# Patient Record
Sex: MALE | Race: WHITE | Employment: OTHER
[De-identification: names, ages, dates, MRNs, and addresses within clinical notes are randomized per-mention and may not be internally consistent; named-entity substitution may affect disease eponyms.]

---

## 2018-09-26 RX ORDER — POTASSIUM CHLORIDE 20 MEQ/1
TABLET, EXTENDED RELEASE ORAL
Qty: 90 TAB | Refills: 1 | Status: SHIPPED | OUTPATIENT
Start: 2018-09-26

## 2024-11-29 ENCOUNTER — APPOINTMENT (OUTPATIENT)
Facility: HOSPITAL | Age: 84
DRG: 521 | End: 2024-11-29
Payer: MEDICARE

## 2024-11-29 ENCOUNTER — HOSPITAL ENCOUNTER (INPATIENT)
Facility: HOSPITAL | Age: 84
Discharge: HOME | DRG: 521 | End: 2024-12-02
Payer: MEDICARE

## 2024-11-29 ENCOUNTER — HOSPITAL ENCOUNTER (INPATIENT)
Facility: HOSPITAL | Age: 84
LOS: 6 days | Discharge: NURSING, REHAB OR LONG-TERM CARE | DRG: 521 | End: 2024-12-05
Attending: STUDENT IN AN ORGANIZED HEALTH CARE EDUCATION/TRAINING PROGRAM
Payer: MEDICARE

## 2024-11-29 DIAGNOSIS — T14.8XXA FRACTURE: ICD-10-CM

## 2024-11-29 DIAGNOSIS — S72.002A CLOSED FRACTURE OF LEFT HIP, INITIAL ENCOUNTER (HCC): Primary | ICD-10-CM

## 2024-11-29 DIAGNOSIS — I63.9 CEREBROVASCULAR ACCIDENT (CVA), UNSPECIFIED MECHANISM (HCC): ICD-10-CM

## 2024-11-29 LAB
ABO + RH BLD: NORMAL
ALBUMIN SERPL-MCNC: 3.9 G/DL (ref 3.5–5)
ALBUMIN/GLOB SERPL: 1.1 (ref 1.1–2.2)
ALP SERPL-CCNC: 116 U/L (ref 45–117)
ALT SERPL-CCNC: 24 U/L (ref 12–78)
ANION GAP SERPL CALC-SCNC: 5 MMOL/L (ref 2–12)
APTT PPP: 26.5 SEC (ref 21.2–34.1)
AST SERPL W P-5'-P-CCNC: 14 U/L (ref 15–37)
BASOPHILS # BLD: 0 K/UL (ref 0–0.1)
BASOPHILS NFR BLD: 0 % (ref 0–1)
BILIRUB SERPL-MCNC: 1.6 MG/DL (ref 0.2–1)
BLOOD GROUP ANTIBODIES SERPL: NEGATIVE
BUN SERPL-MCNC: 22 MG/DL (ref 6–20)
BUN/CREAT SERPL: 17 (ref 12–20)
CA-I BLD-MCNC: 9.1 MG/DL (ref 8.5–10.1)
CHLORIDE SERPL-SCNC: 110 MMOL/L (ref 97–108)
CO2 SERPL-SCNC: 28 MMOL/L (ref 21–32)
CREAT SERPL-MCNC: 1.32 MG/DL (ref 0.7–1.3)
DIFFERENTIAL METHOD BLD: ABNORMAL
EOSINOPHIL # BLD: 0 K/UL (ref 0–0.4)
EOSINOPHIL NFR BLD: 0 % (ref 0–7)
ERYTHROCYTE [DISTWIDTH] IN BLOOD BY AUTOMATED COUNT: 13 % (ref 11.5–14.5)
GLOBULIN SER CALC-MCNC: 3.7 G/DL (ref 2–4)
GLUCOSE SERPL-MCNC: 146 MG/DL (ref 65–100)
HCT VFR BLD AUTO: 46.7 % (ref 36.6–50.3)
HGB BLD-MCNC: 15.8 G/DL (ref 12.1–17)
IMM GRANULOCYTES # BLD AUTO: 0.1 K/UL (ref 0–0.04)
IMM GRANULOCYTES NFR BLD AUTO: 2 % (ref 0–0.5)
INR PPP: 1 (ref 0.9–1.1)
LYMPHOCYTES # BLD: 1.4 K/UL (ref 0.8–3.5)
LYMPHOCYTES NFR BLD: 19 % (ref 12–49)
MCH RBC QN AUTO: 30.5 PG (ref 26–34)
MCHC RBC AUTO-ENTMCNC: 33.8 G/DL (ref 30–36.5)
MCV RBC AUTO: 90.2 FL (ref 80–99)
MONOCYTES # BLD: 0.3 K/UL (ref 0–1)
MONOCYTES NFR BLD: 5 % (ref 5–13)
NEUTS SEG # BLD: 5.2 K/UL (ref 1.8–8)
NEUTS SEG NFR BLD: 74 % (ref 32–75)
NRBC # BLD: 0 K/UL (ref 0–0.01)
NRBC BLD-RTO: 0 PER 100 WBC
PLATELET # BLD AUTO: 228 K/UL (ref 150–400)
PMV BLD AUTO: 9.5 FL (ref 8.9–12.9)
POTASSIUM SERPL-SCNC: 3.9 MMOL/L (ref 3.5–5.1)
PROT SERPL-MCNC: 7.6 G/DL (ref 6.4–8.2)
PROTHROMBIN TIME: 13.5 SEC (ref 11.9–14.6)
RBC # BLD AUTO: 5.18 M/UL (ref 4.1–5.7)
SODIUM SERPL-SCNC: 143 MMOL/L (ref 136–145)
SPECIMEN EXP DATE BLD: NORMAL
THERAPEUTIC RANGE: NORMAL SEC (ref 82–109)
WBC # BLD AUTO: 7.1 K/UL (ref 4.1–11.1)

## 2024-11-29 PROCEDURE — 2580000003 HC RX 258

## 2024-11-29 PROCEDURE — 86850 RBC ANTIBODY SCREEN: CPT

## 2024-11-29 PROCEDURE — 93005 ELECTROCARDIOGRAM TRACING: CPT | Performed by: STUDENT IN AN ORGANIZED HEALTH CARE EDUCATION/TRAINING PROGRAM

## 2024-11-29 PROCEDURE — 99285 EMERGENCY DEPT VISIT HI MDM: CPT

## 2024-11-29 PROCEDURE — 85025 COMPLETE CBC W/AUTO DIFF WBC: CPT

## 2024-11-29 PROCEDURE — 6360000002 HC RX W HCPCS

## 2024-11-29 PROCEDURE — 6360000002 HC RX W HCPCS: Performed by: STUDENT IN AN ORGANIZED HEALTH CARE EDUCATION/TRAINING PROGRAM

## 2024-11-29 PROCEDURE — 73552 X-RAY EXAM OF FEMUR 2/>: CPT

## 2024-11-29 PROCEDURE — 1100000000 HC RM PRIVATE

## 2024-11-29 PROCEDURE — 76700 US EXAM ABDOM COMPLETE: CPT

## 2024-11-29 PROCEDURE — 73502 X-RAY EXAM HIP UNI 2-3 VIEWS: CPT

## 2024-11-29 PROCEDURE — 86901 BLOOD TYPING SEROLOGIC RH(D): CPT

## 2024-11-29 PROCEDURE — 80053 COMPREHEN METABOLIC PANEL: CPT

## 2024-11-29 PROCEDURE — 73700 CT LOWER EXTREMITY W/O DYE: CPT

## 2024-11-29 PROCEDURE — 71045 X-RAY EXAM CHEST 1 VIEW: CPT

## 2024-11-29 PROCEDURE — 86900 BLOOD TYPING SEROLOGIC ABO: CPT

## 2024-11-29 PROCEDURE — 36415 COLL VENOUS BLD VENIPUNCTURE: CPT

## 2024-11-29 PROCEDURE — 85610 PROTHROMBIN TIME: CPT

## 2024-11-29 PROCEDURE — 72125 CT NECK SPINE W/O DYE: CPT

## 2024-11-29 PROCEDURE — 85730 THROMBOPLASTIN TIME PARTIAL: CPT

## 2024-11-29 PROCEDURE — 70450 CT HEAD/BRAIN W/O DYE: CPT

## 2024-11-29 RX ORDER — ACETAMINOPHEN 325 MG/1
650 TABLET ORAL EVERY 6 HOURS PRN
Status: DISCONTINUED | OUTPATIENT
Start: 2024-11-29 | End: 2024-11-29

## 2024-11-29 RX ORDER — HYDROMORPHONE HYDROCHLORIDE 1 MG/ML
1 INJECTION, SOLUTION INTRAMUSCULAR; INTRAVENOUS; SUBCUTANEOUS ONCE
Status: COMPLETED | OUTPATIENT
Start: 2024-11-29 | End: 2024-11-29

## 2024-11-29 RX ORDER — MAGNESIUM SULFATE IN WATER 40 MG/ML
2000 INJECTION, SOLUTION INTRAVENOUS PRN
Status: DISCONTINUED | OUTPATIENT
Start: 2024-11-29 | End: 2024-12-05 | Stop reason: HOSPADM

## 2024-11-29 RX ORDER — POLYETHYLENE GLYCOL 3350 17 G/17G
17 POWDER, FOR SOLUTION ORAL DAILY PRN
Status: DISCONTINUED | OUTPATIENT
Start: 2024-11-29 | End: 2024-12-05 | Stop reason: HOSPADM

## 2024-11-29 RX ORDER — HYDRALAZINE HYDROCHLORIDE 20 MG/ML
10 INJECTION INTRAMUSCULAR; INTRAVENOUS EVERY 6 HOURS PRN
Status: DISCONTINUED | OUTPATIENT
Start: 2024-11-29 | End: 2024-12-05 | Stop reason: HOSPADM

## 2024-11-29 RX ORDER — SODIUM CHLORIDE 9 MG/ML
INJECTION, SOLUTION INTRAVENOUS PRN
Status: DISCONTINUED | OUTPATIENT
Start: 2024-11-29 | End: 2024-11-30 | Stop reason: SDUPTHER

## 2024-11-29 RX ORDER — SODIUM CHLORIDE 9 MG/ML
INJECTION, SOLUTION INTRAVENOUS CONTINUOUS
Status: DISPENSED | OUTPATIENT
Start: 2024-11-29 | End: 2024-11-30

## 2024-11-29 RX ORDER — ONDANSETRON 4 MG/1
4 TABLET, ORALLY DISINTEGRATING ORAL EVERY 8 HOURS PRN
Status: DISCONTINUED | OUTPATIENT
Start: 2024-11-29 | End: 2024-11-30

## 2024-11-29 RX ORDER — MORPHINE SULFATE 4 MG/ML
4 INJECTION, SOLUTION INTRAMUSCULAR; INTRAVENOUS
Status: COMPLETED | OUTPATIENT
Start: 2024-11-29 | End: 2024-11-29

## 2024-11-29 RX ORDER — HYDROMORPHONE HYDROCHLORIDE 1 MG/ML
0.25 INJECTION, SOLUTION INTRAMUSCULAR; INTRAVENOUS; SUBCUTANEOUS EVERY 4 HOURS PRN
Status: DISPENSED | OUTPATIENT
Start: 2024-11-29 | End: 2024-12-01

## 2024-11-29 RX ORDER — SODIUM CHLORIDE 0.9 % (FLUSH) 0.9 %
5-40 SYRINGE (ML) INJECTION PRN
Status: DISCONTINUED | OUTPATIENT
Start: 2024-11-29 | End: 2024-12-05 | Stop reason: HOSPADM

## 2024-11-29 RX ORDER — ACETAMINOPHEN 650 MG/1
650 SUPPOSITORY RECTAL EVERY 6 HOURS PRN
Status: DISCONTINUED | OUTPATIENT
Start: 2024-11-29 | End: 2024-11-29

## 2024-11-29 RX ORDER — ONDANSETRON 2 MG/ML
4 INJECTION INTRAMUSCULAR; INTRAVENOUS EVERY 6 HOURS PRN
Status: DISCONTINUED | OUTPATIENT
Start: 2024-11-29 | End: 2024-11-30

## 2024-11-29 RX ORDER — ACETAMINOPHEN 650 MG/1
650 SUPPOSITORY RECTAL EVERY 6 HOURS PRN
Status: DISCONTINUED | OUTPATIENT
Start: 2024-11-29 | End: 2024-12-05 | Stop reason: HOSPADM

## 2024-11-29 RX ORDER — HYDRALAZINE HYDROCHLORIDE 20 MG/ML
5 INJECTION INTRAMUSCULAR; INTRAVENOUS EVERY 6 HOURS PRN
Status: DISCONTINUED | OUTPATIENT
Start: 2024-11-29 | End: 2024-11-29

## 2024-11-29 RX ORDER — POTASSIUM CHLORIDE 1500 MG/1
40 TABLET, EXTENDED RELEASE ORAL PRN
Status: DISCONTINUED | OUTPATIENT
Start: 2024-11-29 | End: 2024-12-05 | Stop reason: HOSPADM

## 2024-11-29 RX ORDER — ACETAMINOPHEN 325 MG/1
650 TABLET ORAL EVERY 6 HOURS PRN
Status: DISCONTINUED | OUTPATIENT
Start: 2024-11-29 | End: 2024-12-05 | Stop reason: HOSPADM

## 2024-11-29 RX ORDER — POTASSIUM CHLORIDE 7.45 MG/ML
10 INJECTION INTRAVENOUS PRN
Status: DISCONTINUED | OUTPATIENT
Start: 2024-11-29 | End: 2024-12-05 | Stop reason: HOSPADM

## 2024-11-29 RX ORDER — SODIUM CHLORIDE 0.9 % (FLUSH) 0.9 %
5-40 SYRINGE (ML) INJECTION EVERY 12 HOURS SCHEDULED
Status: DISCONTINUED | OUTPATIENT
Start: 2024-11-29 | End: 2024-12-05 | Stop reason: HOSPADM

## 2024-11-29 RX ADMIN — SODIUM CHLORIDE: 9 INJECTION, SOLUTION INTRAVENOUS at 21:02

## 2024-11-29 RX ADMIN — HYDROMORPHONE HYDROCHLORIDE 1 MG: 1 INJECTION, SOLUTION INTRAMUSCULAR; INTRAVENOUS; SUBCUTANEOUS at 18:33

## 2024-11-29 RX ADMIN — HYDRALAZINE HYDROCHLORIDE 10 MG: 20 INJECTION INTRAMUSCULAR; INTRAVENOUS at 18:59

## 2024-11-29 RX ADMIN — MORPHINE SULFATE 4 MG: 4 INJECTION, SOLUTION INTRAMUSCULAR; INTRAVENOUS at 16:06

## 2024-11-29 RX ADMIN — SODIUM CHLORIDE, PRESERVATIVE FREE 10 ML: 5 INJECTION INTRAVENOUS at 21:01

## 2024-11-29 ASSESSMENT — PAIN DESCRIPTION - LOCATION: LOCATION: HIP;GROIN

## 2024-11-29 ASSESSMENT — PAIN DESCRIPTION - ORIENTATION: ORIENTATION: LEFT

## 2024-11-29 ASSESSMENT — PAIN SCALES - GENERAL
PAINLEVEL_OUTOF10: 8
PAINLEVEL_OUTOF10: 5
PAINLEVEL_OUTOF10: 8

## 2024-11-29 NOTE — PROGRESS NOTES
Received Order for Telemetry     Grant Jacobs   1940   [id number]   Fracture [T14.8XXA]   Parris Hinton MD     Tele Box # [id number] placed on patient at  1721 pm  ER Room # 22  Admitting to Room 524  Verified with Primary Nurse Christie at  1722 pm

## 2024-11-29 NOTE — ED TRIAGE NOTES
Fidencio (Son) TACOS - [phone]   Pura - [phone]    Son says absolutely no flu or covid shots.

## 2024-11-29 NOTE — ED NOTES
ED TO INPATIENT SBAR HANDOFF    Patient Name: Grant Jacobs   Preferred Name: Grant  : 1940  84 y.o.   Family/Caregiver Present: no   Code Status Order: Full Code  PO Status: NPO:Yes  Telemetry Order:   C-SSRS: Risk of Suicide: No Risk  Sitter no   Restraints:     Sepsis Risk Score      Situation  Chief Complaint   Patient presents with    Fall     Brief Description of Patient's Condition: Fall with left hip fracture.   Mental Status: alert and coherent  Arrived from:Home  Imaging:   XR CHEST PORTABLE   Final Result      No acute process on portable chest.         Electronically signed by YENY LUTHER      XR HIP 2-3 VW W PELVIS LEFT   Final Result      Acute left hip fracture.      Electronically signed by Gutierrez Burgos      XR FEMUR LEFT (MIN 2 VIEWS)   Final Result      Acute left hip fracture.      Electronically signed by Gutierrez Burgos      CT HEAD WO CONTRAST    (Results Pending)   CT CERVICAL SPINE WO CONTRAST    (Results Pending)   CT HIP LEFT WO CONTRAST    (Results Pending)   US ABDOMEN COMPLETE    (Results Pending)     Abnormal labs:   Abnormal Labs Reviewed   CBC WITH AUTO DIFFERENTIAL - Abnormal; Notable for the following components:       Result Value    Immature Granulocytes % 2 (*)     Immature Granulocytes Absolute 0.1 (*)     All other components within normal limits   COMPREHENSIVE METABOLIC PANEL - Abnormal; Notable for the following components:    Chloride 110 (*)     Glucose 146 (*)     BUN 22 (*)     Creatinine 1.32 (*)     Est, Glom Filt Rate 54 (*)     Total Bilirubin 1.6 (*)     AST 14 (*)     All other components within normal limits       Background  Allergies: Not on File  History: No past medical history on file.    Assessment  Vitals:    Level of Consciousness: Alert (0)   Vitals:    24 1501 24 1608 24 1608   BP: (!) 213/98  (!) 205/101   Pulse: 98     Resp: 20  20   Temp:   98.2 °F (36.8 °C)   TempSrc:   Oral   SpO2: 93%  92%   Weight:  75.1 kg (165 lb 8 oz)

## 2024-11-29 NOTE — CONSULTS
ORTHOPEDIC CONSULT    Patient: Grant Jacobs MRN: [id number]  SSN: [id number]    YOB: 1940  Age: 84 y.o.  Sex: male      Subjective:      Grant Jacobs is a 84 y.o. male who is being seen for acute left hip fracture.  Patient reports he fell while standing in the kitchen and was unable to ambulate afterwards.  He denies any head trauma or loss of consciousness.  He reports that he does not take any blood thinners.  He reports he has pain only in the left hip close to the groin area.  He denies any chest pain, shortness of breath, calf pain, nausea or vomiting or numbness or tingling down the lower extremities.  He ambulates with a cane at baseline.  He reports he lives with his son and daughter-in-law.    No past medical history on file.  No past surgical history on file.   No family history on file.  Social History     Tobacco Use    Smoking status: Not on file    Smokeless tobacco: Not on file   Substance Use Topics    Alcohol use: Not on file      Prior to Admission medications    Not on File       Not on File    Review of Systems:  Review of Systems   Musculoskeletal:  Positive for arthralgias.        Left hip pain   All other systems reviewed and are negative.          Objective:     Current Facility-Administered Medications   Medication Dose Route Frequency    morphine (PF) injection 4 mg  4 mg IntraVENous NOW     No current outpatient medications on file.      Vitals:    11/29/24 1501   BP: (!) 213/98   Pulse: 98   Resp: 20   SpO2: 93%        Alert and oriented x3, No apparent distress    Physical Exam:  MSK:  Left lower extremity: No edema, erythema or ecchymosis noted.  Leg is shortened and externally rotated. Mild tenderness palpation to left hip.  No calf tenderness.  Plantarflexion and dorsiflexion intact.  Neurovascular grossly intact.   Labs:  CBC:  Recent Labs     11/29/24  1443   WBC 7.1   RBC 5.18   HGB 15.8   HCT 46.7   MCV 90.2   RDW 13.0        CHEMISTRIES:  Recent Labs

## 2024-11-29 NOTE — CARE COORDINATION
11/29/24 1646   Service Assessment   Patient Orientation Alert and Oriented   Cognition Alert   History Provided By Child/Family;Medical Record   Primary Caregiver Family   Accompanied By/Relationship Son   Support Systems Family Members;Children   Patient's Healthcare Decision Maker is: Legal Next of Kin   PCP Verified by CM No   Prior Functional Level Independent in ADLs/IADLs   Current Functional Level Other (see comment)  (Unknown at this time)   Can patient return to prior living arrangement Unknown at present   Ability to make needs known: Good   Family able to assist with home care needs: Yes   Would you like for me to discuss the discharge plan with any other family members/significant others, and if so, who? Yes  (Son)     Advance Care Planning     General Advance Care Planning (ACP) Conversation    Date of Conversation: 11/29/2024  Conducted with: Patient with Decision Making Capacity  Other persons present: Son      Healthcare Decision Maker:   Primary Decision Maker: Fidencio Valero - Child - [phone]     Today we documented Decision Maker(s) consistent with Legal Next of Kin hierarchy.    Length of Voluntary ACP Conversation in minutes:  <16 minutes (Non-Billable)    OFELIA Lassiter      CM met w/ pt's son, charted above to discuss dc planning as pt was of unit. Pt recently moved in with son, [id number] [place], [place]. Pt uses a cane as needed, no hh/rehab hx. No PCP, uses [facility] on [address]. CM team to follow.

## 2024-11-29 NOTE — ED PROVIDER NOTES
EMERGENCY DEPARTMENT HISTORY AND PHYSICAL EXAM      Date: 11/29/2024  Patient Name: Grant Jacobs  MRN: [id number]  YOB: 1940  Date of evaluation: 11/29/2024  Provider: Salvatore Harp MD     History of Present Illness     Chief Complaint   Patient presents with    Fall       History Provided By: Patient    HPI: Grant Jacobs, 84 y.o. male with past medical history as listed and reviewed below presenting to the ED for evaluation after fall.  Patient states he was opening some from the fridge turned and fell onto the left side of his head, denies hitting his head, he denies any use of anticoagulations.  He notes pain isolated to the left hip without any numbness or tingling.    Medical History     Past Medical History:  No past medical history on file.    Past Surgical History:  No past surgical history on file.    Family History:  No family history on file.    Social History:       Allergies:  Not on File    PCP: None, None    Current Medications:   Current Facility-Administered Medications   Medication Dose Route Frequency Provider Last Rate Last Admin    sodium chloride flush 0.9 % injection 5-40 mL  5-40 mL IntraVENous 2 times per day Parris Hinton MD        sodium chloride flush 0.9 % injection 5-40 mL  5-40 mL IntraVENous PRN Parris Hinton MD        0.9 % sodium chloride infusion   IntraVENous PRN Parris Hinton MD        potassium chloride (KLOR-CON M) extended release tablet 40 mEq  40 mEq Oral PRN Parris Hinton MD        Or    potassium bicarb-citric acid (EFFER-K) effervescent tablet 40 mEq  40 mEq Oral PRN Parris Hinton MD        Or    potassium chloride 10 mEq/100 mL IVPB (Peripheral Line)  10 mEq IntraVENous PRN Parris Hinton MD        magnesium sulfate 2000 mg in 50 mL IVPB premix  2,000 mg IntraVENous PRN Parris Hinton MD        ondansetron (ZOFRAN-ODT) disintegrating tablet 4 mg  4 mg Oral Q8H PRN Parris Hinton MD        Or    ondansetron (ZOFRAN) injection 4 mg  4 mg IntraVENous Q6H  homophones, and other interpretive errors are inadvertently transcribed by the computer software. Please disregards these errors. Please excuse any errors that have escaped final proofreading.)        Salvatore Harp MD  11/29/24 1739

## 2024-11-29 NOTE — H&P
Hospitalist Admission Note    NAME:   Grant Jacobs   : 1940   MRN: [id number]     Date/Time: 2024 4:03 PM    Patient PCP: None, None    ______________________________________________________________________  Given the patient's current clinical presentation, I have a high level of concern for decompensation if discharged from the emergency department.  Complex decision making was performed, which includes reviewing the patient's available past medical records, laboratory results, and x-ray films.       My assessment of this patient's clinical condition and my plan of care is as follows.    Assessment / Plan:      Left hip fracture  -Patient presented due to a fall  -X-ray left hip: Acute left hip fracture  -CT pending  -Ortho following with plan for OR tentatively tomorrow  -N.p.o. midnight  -SCDs ordered  -Pain control with Tylenol and Dilaudid.  Will avoid NSAIDs due to possible LOREN    Hypertension, uncontrolled  -States that he did have a history of high blood pressure but took himself off of medications for the past year.  Recently moved here and does not have a PCP  -Blood pressure is likely elevated due to pain  -Hydralazine as needed ordered with parameters    Elevated total bilirubin  -Denies any abdominal pain, nausea/vomiting, or fevers at home  -Abdominal exam benign  -Will order ultrasound abdomen      LOREN versus CKD  -Baseline creatinine unknown  -Creatinine 1.32  -Avoid NSAIDs for pain management  -Monitor in AM labs  -Will start NS 75 mL/h as patient will also be n.p.o. at midnight.      Scalp lesion  -Did not have head trauma.  No TTP.  Does not know how long this lesion has been there  -CT head negative  -Could potentially be basal cell carcinoma.  Recommend dermatology follow-up or  PCP follow-up          Medical Decision Making:   I personally reviewed labs:   I personally reviewed imaging:  I personally reviewed EKG:  Toxic drug monitoring:   Discussed case with: ED provider.  showed acute fracture.  CT head was negative.  CT cervical spine was negative.  Social History     Tobacco Use    Smoking status: Not on file    Smokeless tobacco: Not on file   Substance Use Topics    Alcohol use: Not on file        No family history on file.    Not on File     Prior to Admission medications    Not on File         Objective:   VITALS:    Patient Vitals for the past 24 hrs:   BP Pulse Resp SpO2   11/29/24 1501 (!) 213/98 98 20 93 %       No data recorded.        O2 Device: None (Room air)    Wt Readings from Last 12 Encounters:   No data found for Wt         PHYSICAL EXAM:  General: In no acute distress  HEENT: NC/AT. No obvious deformities.Nares patent. Oral mucosa moist.   Cardiovascular: S1, S2 present. No murmurs noted.   Respiratory: Normal respiratory effort. Breath sounds CTA in all lung fields b/l. No retractions noted  Abdomen: BS+. Soft, nontender. No TTP in all quadrants. No guarding or rigidity   Lower Extremity: No edema  MSK: No edema, L hip pain around groin area. Does not want to move. Other extremities strength 5/5   Neurological: CN III-XII grossly intact. Muscle strength 5/5 in UE and 5/5 in LE b/l. No facial drooping or slurring of words.   Psychiatric: Appropriate mood and affect  Skin: Scalp has lesion on right frontal side. No TTP.           LAB DATA REVIEWED:    Recent Results (from the past 12 hour(s))   CBC with Auto Differential    Collection Time: 11/29/24  2:43 PM   Result Value Ref Range    WBC 7.1 4.1 - 11.1 K/uL    RBC 5.18 4.10 - 5.70 M/uL    Hemoglobin 15.8 12.1 - 17.0 g/dL    Hematocrit 46.7 36.6 - 50.3 %    MCV 90.2 80.0 - 99.0 FL    MCH 30.5 26.0 - 34.0 PG    MCHC 33.8 30.0 - 36.5 g/dL    RDW 13.0 11.5 - 14.5 %    Platelets 228 150 - 400 K/uL    MPV 9.5 8.9 - 12.9 FL    Nucleated RBCs 0.0 0.0  WBC    nRBC 0.00 0.00 - 0.01 K/uL    Neutrophils % 74 32 - 75 %    Lymphocytes % 19 12 - 49 %    Monocytes % 5 5 - 13 %    Eosinophils % 0 0 - 7 %    Basophils % 0 0

## 2024-11-30 ENCOUNTER — ANESTHESIA (OUTPATIENT)
Facility: HOSPITAL | Age: 84
End: 2024-11-30
Payer: MEDICARE

## 2024-11-30 ENCOUNTER — APPOINTMENT (OUTPATIENT)
Facility: HOSPITAL | Age: 84
DRG: 521 | End: 2024-11-30
Payer: MEDICARE

## 2024-11-30 ENCOUNTER — ANESTHESIA EVENT (OUTPATIENT)
Facility: HOSPITAL | Age: 84
End: 2024-11-30
Payer: MEDICARE

## 2024-11-30 LAB
ALBUMIN SERPL-MCNC: 3.1 G/DL (ref 3.5–5)
ALBUMIN/GLOB SERPL: 0.8 (ref 1.1–2.2)
ALP SERPL-CCNC: 92 U/L (ref 45–117)
ALT SERPL-CCNC: 19 U/L (ref 12–78)
ANION GAP SERPL CALC-SCNC: 5 MMOL/L (ref 2–12)
AST SERPL W P-5'-P-CCNC: 9 U/L (ref 15–37)
BASOPHILS # BLD: 0 K/UL (ref 0–0.1)
BASOPHILS NFR BLD: 0 % (ref 0–1)
BILIRUB SERPL-MCNC: 2.2 MG/DL (ref 0.2–1)
BUN SERPL-MCNC: 16 MG/DL (ref 6–20)
BUN/CREAT SERPL: 16 (ref 12–20)
CA-I BLD-MCNC: 8.6 MG/DL (ref 8.5–10.1)
CHLORIDE SERPL-SCNC: 111 MMOL/L (ref 97–108)
CO2 SERPL-SCNC: 25 MMOL/L (ref 21–32)
CREAT SERPL-MCNC: 1 MG/DL (ref 0.7–1.3)
DIFFERENTIAL METHOD BLD: ABNORMAL
EOSINOPHIL # BLD: 0.1 K/UL (ref 0–0.4)
EOSINOPHIL NFR BLD: 1 % (ref 0–7)
ERYTHROCYTE [DISTWIDTH] IN BLOOD BY AUTOMATED COUNT: 13.1 % (ref 11.5–14.5)
GLOBULIN SER CALC-MCNC: 3.9 G/DL (ref 2–4)
GLUCOSE BLD STRIP.AUTO-MCNC: 159 MG/DL (ref 65–100)
GLUCOSE SERPL-MCNC: 132 MG/DL (ref 65–100)
HCT VFR BLD AUTO: 42.9 % (ref 36.6–50.3)
HGB BLD-MCNC: 14.6 G/DL (ref 12.1–17)
IMM GRANULOCYTES # BLD AUTO: 0 K/UL (ref 0–0.04)
IMM GRANULOCYTES NFR BLD AUTO: 0 % (ref 0–0.5)
LYMPHOCYTES # BLD: 1.5 K/UL (ref 0.8–3.5)
LYMPHOCYTES NFR BLD: 17 % (ref 12–49)
MCH RBC QN AUTO: 30.4 PG (ref 26–34)
MCHC RBC AUTO-ENTMCNC: 34 G/DL (ref 30–36.5)
MCV RBC AUTO: 89.2 FL (ref 80–99)
MONOCYTES # BLD: 0.5 K/UL (ref 0–1)
MONOCYTES NFR BLD: 5 % (ref 5–13)
NEUTS SEG # BLD: 7 K/UL (ref 1.8–8)
NEUTS SEG NFR BLD: 77 % (ref 32–75)
NRBC # BLD: 0 K/UL (ref 0–0.01)
NRBC BLD-RTO: 0 PER 100 WBC
PERFORMED BY:: ABNORMAL
PLATELET # BLD AUTO: 206 K/UL (ref 150–400)
PMV BLD AUTO: 9.4 FL (ref 8.9–12.9)
POTASSIUM SERPL-SCNC: 3.7 MMOL/L (ref 3.5–5.1)
PROT SERPL-MCNC: 7 G/DL (ref 6.4–8.2)
RBC # BLD AUTO: 4.81 M/UL (ref 4.1–5.7)
SODIUM SERPL-SCNC: 141 MMOL/L (ref 136–145)
WBC # BLD AUTO: 9.1 K/UL (ref 4.1–11.1)

## 2024-11-30 PROCEDURE — 0042T CT BRAIN PERFUSION: CPT

## 2024-11-30 PROCEDURE — 2500000003 HC RX 250 WO HCPCS: Performed by: ANESTHESIOLOGY

## 2024-11-30 PROCEDURE — 2580000003 HC RX 258: Performed by: NURSE ANESTHETIST, CERTIFIED REGISTERED

## 2024-11-30 PROCEDURE — 6360000002 HC RX W HCPCS: Performed by: ANESTHESIOLOGY

## 2024-11-30 PROCEDURE — 0SRS0JZ REPLACEMENT OF LEFT HIP JOINT, FEMORAL SURFACE WITH SYNTHETIC SUBSTITUTE, OPEN APPROACH: ICD-10-PCS | Performed by: ORTHOPAEDIC SURGERY

## 2024-11-30 PROCEDURE — 2580000003 HC RX 258

## 2024-11-30 PROCEDURE — 82962 GLUCOSE BLOOD TEST: CPT

## 2024-11-30 PROCEDURE — 6360000002 HC RX W HCPCS

## 2024-11-30 PROCEDURE — 36415 COLL VENOUS BLD VENIPUNCTURE: CPT

## 2024-11-30 PROCEDURE — 3700000001 HC ADD 15 MINUTES (ANESTHESIA): Performed by: ORTHOPAEDIC SURGERY

## 2024-11-30 PROCEDURE — 6370000000 HC RX 637 (ALT 250 FOR IP)

## 2024-11-30 PROCEDURE — 70498 CT ANGIOGRAPHY NECK: CPT

## 2024-11-30 PROCEDURE — 6360000002 HC RX W HCPCS: Performed by: ORTHOPAEDIC SURGERY

## 2024-11-30 PROCEDURE — 1100000000 HC RM PRIVATE

## 2024-11-30 PROCEDURE — 70450 CT HEAD/BRAIN W/O DYE: CPT

## 2024-11-30 PROCEDURE — 80053 COMPREHEN METABOLIC PANEL: CPT

## 2024-11-30 PROCEDURE — 2500000003 HC RX 250 WO HCPCS: Performed by: NURSE ANESTHETIST, CERTIFIED REGISTERED

## 2024-11-30 PROCEDURE — 2500000003 HC RX 250 WO HCPCS: Performed by: STUDENT IN AN ORGANIZED HEALTH CARE EDUCATION/TRAINING PROGRAM

## 2024-11-30 PROCEDURE — 72170 X-RAY EXAM OF PELVIS: CPT

## 2024-11-30 PROCEDURE — 93005 ELECTROCARDIOGRAM TRACING: CPT | Performed by: ANESTHESIOLOGY

## 2024-11-30 PROCEDURE — 6370000000 HC RX 637 (ALT 250 FOR IP): Performed by: STUDENT IN AN ORGANIZED HEALTH CARE EDUCATION/TRAINING PROGRAM

## 2024-11-30 PROCEDURE — 2709999900 HC NON-CHARGEABLE SUPPLY: Performed by: ORTHOPAEDIC SURGERY

## 2024-11-30 PROCEDURE — 6360000002 HC RX W HCPCS: Performed by: NURSE ANESTHETIST, CERTIFIED REGISTERED

## 2024-11-30 PROCEDURE — 2580000003 HC RX 258: Performed by: ORTHOPAEDIC SURGERY

## 2024-11-30 PROCEDURE — 2720000010 HC SURG SUPPLY STERILE: Performed by: ORTHOPAEDIC SURGERY

## 2024-11-30 PROCEDURE — 6360000004 HC RX CONTRAST MEDICATION: Performed by: STUDENT IN AN ORGANIZED HEALTH CARE EDUCATION/TRAINING PROGRAM

## 2024-11-30 PROCEDURE — 7100000001 HC PACU RECOVERY - ADDTL 15 MIN: Performed by: ORTHOPAEDIC SURGERY

## 2024-11-30 PROCEDURE — 85025 COMPLETE CBC W/AUTO DIFF WBC: CPT

## 2024-11-30 PROCEDURE — 7100000000 HC PACU RECOVERY - FIRST 15 MIN: Performed by: ORTHOPAEDIC SURGERY

## 2024-11-30 PROCEDURE — 3700000000 HC ANESTHESIA ATTENDED CARE: Performed by: ORTHOPAEDIC SURGERY

## 2024-11-30 PROCEDURE — C1776 JOINT DEVICE (IMPLANTABLE): HCPCS | Performed by: ORTHOPAEDIC SURGERY

## 2024-11-30 PROCEDURE — 3600000014 HC SURGERY LEVEL 4 ADDTL 15MIN: Performed by: ORTHOPAEDIC SURGERY

## 2024-11-30 PROCEDURE — 3600000004 HC SURGERY LEVEL 4 BASE: Performed by: ORTHOPAEDIC SURGERY

## 2024-11-30 DEVICE — SUMMIT FEMORAL STEM 12/14 TAPER TAPERED W/DUOFIX HA SIZE 6 HI 150MM
Type: IMPLANTABLE DEVICE | Site: HIP | Status: FUNCTIONAL
Brand: SUMMIT

## 2024-11-30 DEVICE — ARTICUL/EZE FEMORAL HEAD DIAMETER 28MM +5 12/14 TAPER
Type: IMPLANTABLE DEVICE | Site: HIP | Status: FUNCTIONAL
Brand: ARTICUL/EZE

## 2024-11-30 DEVICE — IMPL CAPPED H6 HEMI UNI/BIPOLAR DEPUYSYNTHES: Type: IMPLANTABLE DEVICE | Site: HIP | Status: FUNCTIONAL

## 2024-11-30 DEVICE — SELF CENTERING BI-POLAR HEAD 28MM ID 48MM OD
Type: IMPLANTABLE DEVICE | Site: HIP | Status: FUNCTIONAL
Brand: SELF CENTERING

## 2024-11-30 RX ORDER — CEFAZOLIN SODIUM 1 G/3ML
INJECTION, POWDER, FOR SOLUTION INTRAMUSCULAR; INTRAVENOUS
Status: DISCONTINUED | OUTPATIENT
Start: 2024-11-30 | End: 2024-11-30 | Stop reason: SDUPTHER

## 2024-11-30 RX ORDER — BUPIVACAINE HYDROCHLORIDE 7.5 MG/ML
INJECTION, SOLUTION EPIDURAL; RETROBULBAR
Status: COMPLETED | OUTPATIENT
Start: 2024-11-30 | End: 2024-11-30

## 2024-11-30 RX ORDER — SODIUM CHLORIDE 0.9 % (FLUSH) 0.9 %
5-40 SYRINGE (ML) INJECTION PRN
Status: DISCONTINUED | OUTPATIENT
Start: 2024-11-30 | End: 2024-12-05 | Stop reason: HOSPADM

## 2024-11-30 RX ORDER — DEXMEDETOMIDINE HYDROCHLORIDE 100 UG/ML
INJECTION, SOLUTION INTRAVENOUS
Status: DISCONTINUED | OUTPATIENT
Start: 2024-11-30 | End: 2024-11-30 | Stop reason: SDUPTHER

## 2024-11-30 RX ORDER — DIPHENHYDRAMINE HYDROCHLORIDE 50 MG/ML
12.5 INJECTION INTRAMUSCULAR; INTRAVENOUS
Status: DISCONTINUED | OUTPATIENT
Start: 2024-11-30 | End: 2024-11-30 | Stop reason: HOSPADM

## 2024-11-30 RX ORDER — ONDANSETRON 2 MG/ML
INJECTION INTRAMUSCULAR; INTRAVENOUS
Status: DISCONTINUED | OUTPATIENT
Start: 2024-11-30 | End: 2024-11-30 | Stop reason: SDUPTHER

## 2024-11-30 RX ORDER — METOPROLOL TARTRATE 1 MG/ML
5 INJECTION, SOLUTION INTRAVENOUS ONCE
Status: COMPLETED | OUTPATIENT
Start: 2024-11-30 | End: 2024-11-30

## 2024-11-30 RX ORDER — ONDANSETRON 2 MG/ML
4 INJECTION INTRAMUSCULAR; INTRAVENOUS
Status: DISCONTINUED | OUTPATIENT
Start: 2024-11-30 | End: 2024-11-30 | Stop reason: HOSPADM

## 2024-11-30 RX ORDER — PROPOFOL 10 MG/ML
INJECTION, EMULSION INTRAVENOUS
Status: DISCONTINUED | OUTPATIENT
Start: 2024-11-30 | End: 2024-11-30 | Stop reason: SDUPTHER

## 2024-11-30 RX ORDER — PANTOPRAZOLE SODIUM 40 MG/1
40 TABLET, DELAYED RELEASE ORAL
Status: DISCONTINUED | OUTPATIENT
Start: 2024-12-01 | End: 2024-12-05 | Stop reason: HOSPADM

## 2024-11-30 RX ORDER — POLYETHYLENE GLYCOL 3350 17 G/17G
17 POWDER, FOR SOLUTION ORAL DAILY PRN
Status: DISCONTINUED | OUTPATIENT
Start: 2024-11-30 | End: 2024-11-30

## 2024-11-30 RX ORDER — SODIUM CHLORIDE 0.9 % (FLUSH) 0.9 %
5-40 SYRINGE (ML) INJECTION EVERY 12 HOURS SCHEDULED
Status: DISCONTINUED | OUTPATIENT
Start: 2024-11-30 | End: 2024-12-05 | Stop reason: HOSPADM

## 2024-11-30 RX ORDER — FENTANYL CITRATE 0.05 MG/ML
50 INJECTION, SOLUTION INTRAMUSCULAR; INTRAVENOUS EVERY 5 MIN PRN
Status: DISCONTINUED | OUTPATIENT
Start: 2024-11-30 | End: 2024-11-30 | Stop reason: HOSPADM

## 2024-11-30 RX ORDER — SODIUM CHLORIDE 0.9 % (FLUSH) 0.9 %
5-40 SYRINGE (ML) INJECTION PRN
Status: DISCONTINUED | OUTPATIENT
Start: 2024-11-30 | End: 2024-11-30 | Stop reason: HOSPADM

## 2024-11-30 RX ORDER — OXYCODONE HYDROCHLORIDE 5 MG/1
5 TABLET ORAL PRN
Status: DISCONTINUED | OUTPATIENT
Start: 2024-11-30 | End: 2024-11-30 | Stop reason: HOSPADM

## 2024-11-30 RX ORDER — SODIUM CHLORIDE 9 MG/ML
INJECTION, SOLUTION INTRAVENOUS PRN
Status: DISCONTINUED | OUTPATIENT
Start: 2024-11-30 | End: 2024-11-30 | Stop reason: HOSPADM

## 2024-11-30 RX ORDER — PHENYLEPHRINE HYDROCHLORIDE 10 MG/ML
INJECTION INTRAVENOUS
Status: DISCONTINUED | OUTPATIENT
Start: 2024-11-30 | End: 2024-11-30 | Stop reason: SDUPTHER

## 2024-11-30 RX ORDER — SODIUM CHLORIDE, SODIUM LACTATE, POTASSIUM CHLORIDE, CALCIUM CHLORIDE 600; 310; 30; 20 MG/100ML; MG/100ML; MG/100ML; MG/100ML
INJECTION, SOLUTION INTRAVENOUS
Status: DISCONTINUED | OUTPATIENT
Start: 2024-11-30 | End: 2024-11-30 | Stop reason: SDUPTHER

## 2024-11-30 RX ORDER — ONDANSETRON 4 MG/1
4 TABLET, ORALLY DISINTEGRATING ORAL EVERY 8 HOURS PRN
Status: DISCONTINUED | OUTPATIENT
Start: 2024-11-30 | End: 2024-12-05 | Stop reason: HOSPADM

## 2024-11-30 RX ORDER — SODIUM CHLORIDE, SODIUM LACTATE, POTASSIUM CHLORIDE, CALCIUM CHLORIDE 600; 310; 30; 20 MG/100ML; MG/100ML; MG/100ML; MG/100ML
INJECTION, SOLUTION INTRAVENOUS ONCE
Status: DISCONTINUED | OUTPATIENT
Start: 2024-11-30 | End: 2024-11-30

## 2024-11-30 RX ORDER — IOPAMIDOL 755 MG/ML
100 INJECTION, SOLUTION INTRAVASCULAR
Status: COMPLETED | OUTPATIENT
Start: 2024-11-30 | End: 2024-11-30

## 2024-11-30 RX ORDER — IPRATROPIUM BROMIDE AND ALBUTEROL SULFATE 2.5; .5 MG/3ML; MG/3ML
1 SOLUTION RESPIRATORY (INHALATION)
Status: DISCONTINUED | OUTPATIENT
Start: 2024-11-30 | End: 2024-11-30 | Stop reason: HOSPADM

## 2024-11-30 RX ORDER — HYDRALAZINE HYDROCHLORIDE 20 MG/ML
10 INJECTION INTRAMUSCULAR; INTRAVENOUS
Status: DISCONTINUED | OUTPATIENT
Start: 2024-11-30 | End: 2024-11-30 | Stop reason: HOSPADM

## 2024-11-30 RX ORDER — LABETALOL HYDROCHLORIDE 5 MG/ML
10 INJECTION, SOLUTION INTRAVENOUS EVERY 4 HOURS PRN
Status: DISCONTINUED | OUTPATIENT
Start: 2024-11-30 | End: 2024-12-05 | Stop reason: HOSPADM

## 2024-11-30 RX ORDER — OXYCODONE HYDROCHLORIDE 5 MG/1
10 TABLET ORAL PRN
Status: DISCONTINUED | OUTPATIENT
Start: 2024-11-30 | End: 2024-11-30 | Stop reason: HOSPADM

## 2024-11-30 RX ORDER — GLUCAGON 1 MG/ML
1 KIT INJECTION PRN
Status: DISCONTINUED | OUTPATIENT
Start: 2024-11-30 | End: 2024-11-30 | Stop reason: HOSPADM

## 2024-11-30 RX ORDER — LABETALOL HYDROCHLORIDE 5 MG/ML
10 INJECTION, SOLUTION INTRAVENOUS
Status: DISCONTINUED | OUTPATIENT
Start: 2024-11-30 | End: 2024-11-30 | Stop reason: HOSPADM

## 2024-11-30 RX ORDER — ATORVASTATIN CALCIUM 40 MG/1
80 TABLET, FILM COATED ORAL NIGHTLY
Status: DISCONTINUED | OUTPATIENT
Start: 2024-11-30 | End: 2024-12-05 | Stop reason: HOSPADM

## 2024-11-30 RX ORDER — DEXTROSE MONOHYDRATE 100 MG/ML
INJECTION, SOLUTION INTRAVENOUS CONTINUOUS PRN
Status: DISCONTINUED | OUTPATIENT
Start: 2024-11-30 | End: 2024-11-30 | Stop reason: HOSPADM

## 2024-11-30 RX ORDER — SODIUM CHLORIDE 0.9 % (FLUSH) 0.9 %
5-40 SYRINGE (ML) INJECTION EVERY 12 HOURS SCHEDULED
Status: DISCONTINUED | OUTPATIENT
Start: 2024-11-30 | End: 2024-11-30 | Stop reason: HOSPADM

## 2024-11-30 RX ORDER — ONDANSETRON 2 MG/ML
4 INJECTION INTRAMUSCULAR; INTRAVENOUS EVERY 6 HOURS PRN
Status: DISCONTINUED | OUTPATIENT
Start: 2024-11-30 | End: 2024-12-05 | Stop reason: HOSPADM

## 2024-11-30 RX ORDER — HYDROMORPHONE HYDROCHLORIDE 1 MG/ML
0.5 INJECTION, SOLUTION INTRAMUSCULAR; INTRAVENOUS; SUBCUTANEOUS EVERY 5 MIN PRN
Status: DISCONTINUED | OUTPATIENT
Start: 2024-11-30 | End: 2024-11-30 | Stop reason: HOSPADM

## 2024-11-30 RX ORDER — LIDOCAINE HYDROCHLORIDE 20 MG/ML
INJECTION, SOLUTION EPIDURAL; INFILTRATION; INTRACAUDAL; PERINEURAL
Status: DISCONTINUED | OUTPATIENT
Start: 2024-11-30 | End: 2024-11-30 | Stop reason: SDUPTHER

## 2024-11-30 RX ORDER — EPHEDRINE SULFATE 50 MG/ML
INJECTION INTRAVENOUS
Status: DISCONTINUED | OUTPATIENT
Start: 2024-11-30 | End: 2024-11-30 | Stop reason: SDUPTHER

## 2024-11-30 RX ORDER — SODIUM CHLORIDE 9 MG/ML
INJECTION, SOLUTION INTRAVENOUS CONTINUOUS
Status: DISCONTINUED | OUTPATIENT
Start: 2024-11-30 | End: 2024-11-30 | Stop reason: HOSPADM

## 2024-11-30 RX ORDER — NALOXONE HYDROCHLORIDE 0.4 MG/ML
INJECTION, SOLUTION INTRAMUSCULAR; INTRAVENOUS; SUBCUTANEOUS PRN
Status: DISCONTINUED | OUTPATIENT
Start: 2024-11-30 | End: 2024-11-30 | Stop reason: HOSPADM

## 2024-11-30 RX ORDER — SODIUM CHLORIDE 9 MG/ML
INJECTION, SOLUTION INTRAVENOUS PRN
Status: DISCONTINUED | OUTPATIENT
Start: 2024-11-30 | End: 2024-12-05 | Stop reason: HOSPADM

## 2024-11-30 RX ORDER — LORAZEPAM 2 MG/ML
0.5 INJECTION INTRAMUSCULAR
Status: DISCONTINUED | OUTPATIENT
Start: 2024-11-30 | End: 2024-11-30 | Stop reason: HOSPADM

## 2024-11-30 RX ORDER — DEXAMETHASONE SODIUM PHOSPHATE 4 MG/ML
INJECTION, SOLUTION INTRA-ARTICULAR; INTRALESIONAL; INTRAMUSCULAR; INTRAVENOUS; SOFT TISSUE
Status: DISCONTINUED | OUTPATIENT
Start: 2024-11-30 | End: 2024-11-30 | Stop reason: SDUPTHER

## 2024-11-30 RX ORDER — METOCLOPRAMIDE HYDROCHLORIDE 5 MG/ML
10 INJECTION INTRAMUSCULAR; INTRAVENOUS
Status: DISCONTINUED | OUTPATIENT
Start: 2024-11-30 | End: 2024-11-30 | Stop reason: HOSPADM

## 2024-11-30 RX ADMIN — HYDROMORPHONE HYDROCHLORIDE 0.25 MG: 1 INJECTION, SOLUTION INTRAMUSCULAR; INTRAVENOUS; SUBCUTANEOUS at 03:52

## 2024-11-30 RX ADMIN — DEXMEDETOMIDINE 12 MCG: 100 INJECTION, SOLUTION INTRAVENOUS at 10:01

## 2024-11-30 RX ADMIN — DEXAMETHASONE SODIUM PHOSPHATE 4 MG: 4 INJECTION, SOLUTION INTRA-ARTICULAR; INTRALESIONAL; INTRAMUSCULAR; INTRAVENOUS; SOFT TISSUE at 10:49

## 2024-11-30 RX ADMIN — EPHEDRINE SULFATE 10 MG: 50 INJECTION INTRAVENOUS at 11:28

## 2024-11-30 RX ADMIN — SODIUM CHLORIDE, PRESERVATIVE FREE 10 ML: 5 INJECTION INTRAVENOUS at 20:45

## 2024-11-30 RX ADMIN — HYDROMORPHONE HYDROCHLORIDE 0.25 MG: 1 INJECTION, SOLUTION INTRAMUSCULAR; INTRAVENOUS; SUBCUTANEOUS at 21:09

## 2024-11-30 RX ADMIN — PHENYLEPHRINE HYDROCHLORIDE 40 MCG/MIN: 10 INJECTION INTRAVENOUS at 09:54

## 2024-11-30 RX ADMIN — SODIUM CHLORIDE, PRESERVATIVE FREE 10 ML: 5 INJECTION INTRAVENOUS at 08:15

## 2024-11-30 RX ADMIN — LIDOCAINE HYDROCHLORIDE 100 MG: 20 SOLUTION INTRAVENOUS at 09:51

## 2024-11-30 RX ADMIN — HYDROMORPHONE HYDROCHLORIDE 0.25 MG: 1 INJECTION, SOLUTION INTRAMUSCULAR; INTRAVENOUS; SUBCUTANEOUS at 15:29

## 2024-11-30 RX ADMIN — HYDRALAZINE HYDROCHLORIDE 10 MG: 20 INJECTION INTRAMUSCULAR; INTRAVENOUS at 08:14

## 2024-11-30 RX ADMIN — METOPROLOL TARTRATE 5 MG: 5 INJECTION INTRAVENOUS at 20:42

## 2024-11-30 RX ADMIN — ATORVASTATIN CALCIUM 80 MG: 40 TABLET, FILM COATED ORAL at 20:41

## 2024-11-30 RX ADMIN — PHENYLEPHRINE HYDROCHLORIDE 200 MCG: 10 INJECTION INTRAVENOUS at 10:00

## 2024-11-30 RX ADMIN — PROPOFOL 75 MCG/KG/MIN: 10 INJECTION, EMULSION INTRAVENOUS at 09:50

## 2024-11-30 RX ADMIN — ACETAMINOPHEN 650 MG: 325 TABLET ORAL at 23:39

## 2024-11-30 RX ADMIN — HYDROMORPHONE HYDROCHLORIDE 0.25 MG: 1 INJECTION, SOLUTION INTRAMUSCULAR; INTRAVENOUS; SUBCUTANEOUS at 00:10

## 2024-11-30 RX ADMIN — PHENYLEPHRINE HYDROCHLORIDE 200 MCG: 10 INJECTION INTRAVENOUS at 09:45

## 2024-11-30 RX ADMIN — ONDANSETRON 4 MG: 2 INJECTION INTRAMUSCULAR; INTRAVENOUS at 10:49

## 2024-11-30 RX ADMIN — IOPAMIDOL 100 ML: 755 INJECTION, SOLUTION INTRAVENOUS at 19:31

## 2024-11-30 RX ADMIN — CEFAZOLIN 2 G: 1 INJECTION, POWDER, FOR SOLUTION INTRAMUSCULAR; INTRAVENOUS at 09:50

## 2024-11-30 RX ADMIN — HYDROMORPHONE HYDROCHLORIDE 0.25 MG: 1 INJECTION, SOLUTION INTRAMUSCULAR; INTRAVENOUS; SUBCUTANEOUS at 08:14

## 2024-11-30 RX ADMIN — HYDRALAZINE HYDROCHLORIDE 10 MG: 20 INJECTION INTRAMUSCULAR; INTRAVENOUS at 15:28

## 2024-11-30 RX ADMIN — SODIUM CHLORIDE, POTASSIUM CHLORIDE, SODIUM LACTATE AND CALCIUM CHLORIDE: 600; 310; 30; 20 INJECTION, SOLUTION INTRAVENOUS at 09:51

## 2024-11-30 RX ADMIN — BUPIVACAINE HYDROCHLORIDE 15 MG: 7.5 INJECTION, SOLUTION EPIDURAL; RETROBULBAR at 09:30

## 2024-11-30 RX ADMIN — ACETAMINOPHEN 650 MG: 325 TABLET ORAL at 02:42

## 2024-11-30 ASSESSMENT — PAIN SCALES - GENERAL
PAINLEVEL_OUTOF10: 10
PAINLEVEL_OUTOF10: 9
PAINLEVEL_OUTOF10: 8
PAINLEVEL_OUTOF10: 8
PAINLEVEL_OUTOF10: 6
PAINLEVEL_OUTOF10: 0
PAINLEVEL_OUTOF10: 4
PAINLEVEL_OUTOF10: 3
PAINLEVEL_OUTOF10: 9
PAINLEVEL_OUTOF10: 6

## 2024-11-30 ASSESSMENT — PAIN DESCRIPTION - LOCATION
LOCATION: LEG
LOCATION: HIP

## 2024-11-30 ASSESSMENT — PAIN SCALES - WONG BAKER
WONGBAKER_NUMERICALRESPONSE: HURTS A LITTLE BIT
WONGBAKER_NUMERICALRESPONSE: HURTS A LITTLE BIT

## 2024-11-30 ASSESSMENT — PAIN DESCRIPTION - DESCRIPTORS: DESCRIPTORS: ACHING

## 2024-11-30 ASSESSMENT — PAIN DESCRIPTION - ORIENTATION
ORIENTATION: LEFT

## 2024-11-30 ASSESSMENT — PAIN - FUNCTIONAL ASSESSMENT: PAIN_FUNCTIONAL_ASSESSMENT: FACE, LEGS, ACTIVITY, CRY, AND CONSOLABILITY (FLACC)

## 2024-11-30 NOTE — ANESTHESIA PROCEDURE NOTES
Spinal Block    Patient location during procedure: OR  End time: 11/30/2024 9:40 AM  Reason for block: primary anesthetic  Staffing  Performed: resident/CRNA   Anesthesiologist: Shantal Goodwin MD  Resident/CRNA: Hansel Melgar APRN - CRNA  Performed by: Hansel Melgar APRN - CRNA  Authorized by: Shantal Goodwin MD    Spinal Block  Patient position: left lateral decubitus  Prep: ChloraPrep  Patient monitoring: cardiac monitor, continuous pulse ox, continuous capnometry, frequent blood pressure checks and oxygen  Approach: midline  Location: L4/L5  Provider prep: mask and sterile gloves  Local infiltration: lidocaine  Needle  Needle type: Quincke   Needle gauge: 22 G  Assessment  Swirl obtained: Yes  CSF: clear  Attempts: 1  Hemodynamics: stable  Additional Notes  +csf, -heme, -parasthesia  Preanesthetic Checklist  Completed: patient identified, IV checked, site marked, risks and benefits discussed, surgical/procedural consents, equipment checked, pre-op evaluation, timeout performed, anesthesia consent given, oxygen available, monitors applied/VS acknowledged, fire risk safety assessment completed and verbalized and blood product R/B/A discussed and consented

## 2024-11-30 NOTE — ANESTHESIA PRE PROCEDURE
Department of Anesthesiology  Preprocedure Note       Name:  Grant Jacobs   Age:  84 y.o.  :  1940                                          MRN:  [id number]         Date:  2024      Surgeon: Surgeon(s):  Bimal Sin MD    Procedure: Procedure(s):  HIP HEMIARTHROPLASTY    Medications prior to admission:   Prior to Admission medications    Not on File       Current medications:    Current Facility-Administered Medications   Medication Dose Route Frequency Provider Last Rate Last Admin    sodium chloride flush 0.9 % injection 5-40 mL  5-40 mL IntraVENous 2 times per day Parris Hinton MD   10 mL at 24 0815    sodium chloride flush 0.9 % injection 5-40 mL  5-40 mL IntraVENous PRN Parris Hinton MD        0.9 % sodium chloride infusion   IntraVENous PRN Parris Hinton MD        potassium chloride (KLOR-CON M) extended release tablet 40 mEq  40 mEq Oral PRN Parris Hinton MD        Or    potassium bicarb-citric acid (EFFER-K) effervescent tablet 40 mEq  40 mEq Oral PRN Parris Hinton MD        Or    potassium chloride 10 mEq/100 mL IVPB (Peripheral Line)  10 mEq IntraVENous PRN Parris Hinton MD        magnesium sulfate 2000 mg in 50 mL IVPB premix  2,000 mg IntraVENous PRN Parris Hinton MD        ondansetron (ZOFRAN-ODT) disintegrating tablet 4 mg  4 mg Oral Q8H PRN Parris Hinton MD        Or    ondansetron (ZOFRAN) injection 4 mg  4 mg IntraVENous Q6H PRN Parris Hinton MD        polyethylene glycol (GLYCOLAX) packet 17 g  17 g Oral Daily PRN Parris Hinton MD        0.9 % sodium chloride infusion   IntraVENous Continuous Parris Hinton MD 75 mL/hr at 24 2102 New Bag at 24 210    HYDROmorphone HCl PF (DILAUDID) injection 0.25 mg  0.25 mg IntraVENous Q4H PRN Parris Hinton MD   0.25 mg at 24 0814    acetaminophen (TYLENOL) tablet 650 mg  650 mg Oral Q6H PRN Parris Hinton MD   650 mg at 24 0242    Or    acetaminophen (TYLENOL) suppository 650 mg  650 mg Rectal Q6H PRN

## 2024-11-30 NOTE — ANESTHESIA POSTPROCEDURE EVALUATION
Department of Anesthesiology  Postprocedure Note    Patient: Grant Jacobs  MRN: [id number]  YOB: 1940  Date of evaluation: 11/30/2024    Procedure Summary       Date: 11/30/24 Room / Location: [facility] MAIN OR 07 / [facility] MAIN OR    Anesthesia Start: 0930 Anesthesia Stop: 1114    Procedure: HIP HEMIARTHROPLASTY (Left: Hip) Diagnosis:       Closed fracture of neck of left femur, initial encounter ([facility])      (Closed fracture of neck of left femur, initial encounter ([facility]) [S72.002A])    Surgeons: Bimal Sin MD Responsible Provider: Shantal Goodwin MD    Anesthesia Type: spinal, MAC, TIVA ASA Status: 3            Anesthesia Type: No value filed.    Lynda Phase I: Lynda Score: 9    Lynda Phase II:      Anesthesia Post Evaluation    Patient location during evaluation: PACU  Patient participation: complete - patient participated  Level of consciousness: sleepy but conscious  Airway patency: patent  Nausea & Vomiting: no nausea and no vomiting  Cardiovascular status: hemodynamically stable  Respiratory status: acceptable  Hydration status: euvolemic  Pain management: adequate    No notable events documented.

## 2024-11-30 NOTE — OP NOTE
Operative Note      Patient: Grant Jacobs  YOB: 1940  MRN: [id number]    Date of Procedure: 11/30/2024    Pre-Op Diagnosis Codes:      * Closed fracture of neck of left femur, initial encounter ([facility]) [S72.002A]    Post-Op Diagnosis: Same       Procedure(s):  HIP HEMIARTHROPLASTY    Surgeon(s):  Bimal Sin MD    Assistant:   * No surgical staff found *    Anesthesia: General    Estimated Blood Loss (mL): less than 100     Complications: None    Specimens:   * No specimens in log *    Implants:  Implant Name Type Inv. Item Serial No.  Lot No. LRB No. Used Action   HEAD FEM [id number] +5MM OFFSET STD 12/14 TAPR ARTC EZ HIP MTL - SNA  HEAD FEM [id number] +5MM OFFSET STD 12/14 TAPR ARTC EZ HIP MTL [facility] [id number] Left 1 Implanted   HEAD BPLR OD48MM [id number] FEM HIP SELF CNTR - SNA  HEAD BPLR OD48MM ID28MM FEM HIP SELF CNTR NA [facility] [id number] Left 1 Implanted   STEM FEM SZ 6 L150MM NK L41MM 50MM OFFSET 130DEG CALCAR HIP - SNA  STEM FEM SZ 6 L150MM NK L41MM 50MM OFFSET 130DEG CALCAR HIP NA [facility] [id number] Left 1 Implanted         Drains: * No LDAs found *    Findings:  Infection Present At Time Of Surgery (PATOS) (choose all levels that have infection present):  No infection present  Other Findings: left hip femoral neck fracture    Detailed Description of Procedure:   Indications: The patient has sustained a left hip femoral neck fracture.  The patient was educated on the risk and benefits of nonoperative management versus surgical fixation versus hemiarthroplasty versus total hip arthroplasty.  The patient desires hemiarthroplasty.  Risks of surgery include pain, scar, infection, dislocation, instability, leg length differences, heterotopic ossification, rotation changes, stiffness, implant loosening, fracture, revision surgery, blood clots, pulmonary embolism, heart attack, stroke and death.    Surgery: The patient  was placed on the operating table with all bony prominences well-padded. The patient then underwent anesthesia without complications. The patient was then positioned in the lateral decubitus. The operative hip and extremity were then prepped and draped in standard fashion.  A surgical timeout was performed indicating that this was the correct patient, procedure, and extremity.  All necessary equipment was available and sterile.  Everyone in the room was in agreement.  The patient received appropriate antibiotics, tranexamic acid, and oral pain control medications as indicated.    A posterior approach was then made in standard fashion.  Electrocautery was used down to the IT band fascia.  The fascia was then incised.  The retractors were placed and the piriformis, short external rotators and capsule were then incised as a single sleeve and tagged for later repair.  The femoral neck cut was then made.  The fractured femoral head and neck were then removed.  The acetabulum was inspected and deemed to have adequate cartilage with no significant arthrosis.  The head trials were then sized.  The femur was then sequentially broached ensuring adequate lateralization.  Once the appropriate broach size was achieved the trial head and neck were placed and the hip was reduced.  Once appropriate stability (F90, ER80) was achieved the trials were removed.  The hip was irrigated copiously.  The final implants were then placed.  The hip was stable in all planes.  Pericapsular injection was then performed followed by Irrisept.  The wound was then copiously irrigated.  The short external rotators/piriformis and capsule were then repaired through bone tunnels using horizontal mattress technique.  The IT band and deep adipose layers were then closed with 0 strata fix.  The subdermis and subcuticular layers were closed with 2-0 Vicryl and 3-0 strata fix.  Dermabond and silver dressing were applied.  The patient tolerated the procedure

## 2024-11-30 NOTE — PROGRESS NOTES
Hospitalist Progress Note    NAME:   Grant Jacobs   : 1940   MRN: [id number]     Date/Time: 2024 8:14 AM  Patient PCP: None, None    Estimated discharge date:48hrs  Barriers: OR today, PT/OT, likely placement    Hospital Course:  Mr. Jacobs is an 84-year-old male with history of hypertension who presented to the emergency department after mechanical ground-level fall.  Patient states that he fell in the kitchen. No syncopal episode. Did not have episodes of chest pain/palpitations afterwards. Denies head trauma. He has had chronic left knee pain and gradual difficulty ambulating. Otherwise, he states that he is fairly healthy and is not taking any daily medicines as he self discontinued his antihypertensives.     In the ED trauma bravo workup obtained.  Vital signs notable for uncontrolled hypertension.  Labs without leukocytosis, anemia.  CMP notable for creatinine of 1.32, elevated total bilirubin 1.6.  CT of the head negative for acute infarct.  CT C-spine negative for fracture.  Left hip x-ray showed acute left femoral neck fracture, confirmed by CT left hip.  Orthopedics consulted and plan for surgical intervention.  Patient subsequently admitted to our service.  Ultrasound of the abdomen obtained given hyperbilirubinemia, cholecystolithiasis no evidence of acute cholecystitis.  Patient taken to the OR  for left hip arthroplasty.    Assessment / Plan:    Left hip fracture status post mechanical ground-level fall  -X-ray left hip: Acute left hip fracture  -CT acute subcapital left femoral neck fracture with varus and anterior angulation  -OR  for left hip arthroplasty  -SCDs ordered  -Pain control with Tylenol and Dilaudid.       Hypertension, uncontrolled  -States that he did have a history of high blood pressure but took himself off of medications for the past year.  Recently moved here and does not have a PCP  -Suspect elevation due to pain  -IV hydralazine as needed  -Initiate

## 2024-11-30 NOTE — PROGRESS NOTES
No acute events overnight.  Pt has been NPO and is ready for surgery. Plan is for a left hip hemiarthroplasty.    Diagnosis was discussed with the patient and via telephone with the son of the patient. Treatment options were discussed including conservative and surgical treatment.  Risks and benefits of both options were discussed.  The risks of surgery including leg length changes, dislocation, infection, nerve and vessel injury, blood loss, hardware related problems, fracture, deformity, stiffness, funtional deficits, chronic pain, posttraumatic arthritis, possible need for future surgery, blood clots, stroke, heart attack, death were discussed.  The patient wishes to proceed with the recommended surgical treatment of the fracture as the definitive treatment.  The patient appears to understand the various issues discussed today, and wishes to proceed with the proposed treatment.

## 2024-12-01 LAB
ALBUMIN SERPL-MCNC: 3 G/DL (ref 3.5–5)
ALBUMIN/GLOB SERPL: 0.7 (ref 1.1–2.2)
ALP SERPL-CCNC: 89 U/L (ref 45–117)
ALT SERPL-CCNC: 21 U/L (ref 12–78)
ANION GAP SERPL CALC-SCNC: 4 MMOL/L (ref 2–12)
APPEARANCE UR: CLEAR
AST SERPL W P-5'-P-CCNC: 35 U/L (ref 15–37)
BACTERIA URNS QL MICRO: NEGATIVE /HPF
BASOPHILS # BLD: 0 K/UL (ref 0–0.1)
BASOPHILS NFR BLD: 0 % (ref 0–1)
BILIRUB DIRECT SERPL-MCNC: 0.5 MG/DL (ref 0–0.2)
BILIRUB SERPL-MCNC: 3.2 MG/DL (ref 0.2–1)
BILIRUB UR QL: NEGATIVE
BUN SERPL-MCNC: 20 MG/DL (ref 6–20)
BUN/CREAT SERPL: 17 (ref 12–20)
CA-I BLD-MCNC: 8.8 MG/DL (ref 8.5–10.1)
CHLORIDE SERPL-SCNC: 111 MMOL/L (ref 97–108)
CHOLEST SERPL-MCNC: 229 MG/DL
CO2 SERPL-SCNC: 26 MMOL/L (ref 21–32)
COLOR UR: ABNORMAL
CREAT SERPL-MCNC: 1.21 MG/DL (ref 0.7–1.3)
D DIMER PPP FEU-MCNC: 2.01 UG/ML(FEU)
DIFFERENTIAL METHOD BLD: ABNORMAL
EKG ATRIAL RATE: 101 BPM
EKG ATRIAL RATE: 114 BPM
EKG DIAGNOSIS: NORMAL
EKG DIAGNOSIS: NORMAL
EKG P AXIS: 64 DEGREES
EKG P AXIS: 69 DEGREES
EKG P-R INTERVAL: 164 MS
EKG P-R INTERVAL: 194 MS
EKG Q-T INTERVAL: 348 MS
EKG Q-T INTERVAL: 382 MS
EKG QRS DURATION: 122 MS
EKG QRS DURATION: 130 MS
EKG QTC CALCULATION (BAZETT): 479 MS
EKG QTC CALCULATION (BAZETT): 495 MS
EKG R AXIS: -69 DEGREES
EKG R AXIS: -72 DEGREES
EKG T AXIS: 20 DEGREES
EKG T AXIS: 5 DEGREES
EKG VENTRICULAR RATE: 101 BPM
EKG VENTRICULAR RATE: 114 BPM
EOSINOPHIL # BLD: 0 K/UL (ref 0–0.4)
EOSINOPHIL NFR BLD: 0 % (ref 0–7)
EPITH CASTS URNS QL MICRO: ABNORMAL /LPF
ERYTHROCYTE [DISTWIDTH] IN BLOOD BY AUTOMATED COUNT: 13.5 % (ref 11.5–14.5)
EST. AVERAGE GLUCOSE BLD GHB EST-MCNC: 114 MG/DL
GLOBULIN SER CALC-MCNC: 4.2 G/DL (ref 2–4)
GLUCOSE SERPL-MCNC: 124 MG/DL (ref 65–100)
GLUCOSE UR STRIP.AUTO-MCNC: NEGATIVE MG/DL
HBA1C MFR BLD: 5.6 % (ref 4–5.6)
HCT VFR BLD AUTO: 44.3 % (ref 36.6–50.3)
HDLC SERPL-MCNC: 53 MG/DL
HDLC SERPL: 4.3 (ref 0–5)
HGB BLD-MCNC: 14.5 G/DL (ref 12.1–17)
HGB UR QL STRIP: ABNORMAL
IMM GRANULOCYTES # BLD AUTO: 0 K/UL (ref 0–0.04)
IMM GRANULOCYTES NFR BLD AUTO: 0 % (ref 0–0.5)
KETONES UR QL STRIP.AUTO: 5 MG/DL
LDLC SERPL CALC-MCNC: 154.6 MG/DL (ref 0–100)
LEUKOCYTE ESTERASE UR QL STRIP.AUTO: NEGATIVE
LIPID PANEL: ABNORMAL
LYMPHOCYTES # BLD: 1.2 K/UL (ref 0.8–3.5)
LYMPHOCYTES NFR BLD: 11 % (ref 12–49)
MCH RBC QN AUTO: 30.5 PG (ref 26–34)
MCHC RBC AUTO-ENTMCNC: 32.7 G/DL (ref 30–36.5)
MCV RBC AUTO: 93.3 FL (ref 80–99)
MONOCYTES # BLD: 0.8 K/UL (ref 0–1)
MONOCYTES NFR BLD: 7 % (ref 5–13)
NEUTS SEG # BLD: 9 K/UL (ref 1.8–8)
NEUTS SEG NFR BLD: 82 % (ref 32–75)
NITRITE UR QL STRIP.AUTO: NEGATIVE
NRBC # BLD: 0 K/UL (ref 0–0.01)
NRBC BLD-RTO: 0 PER 100 WBC
PH UR STRIP: 5 (ref 5–8)
PLATELET # BLD AUTO: 185 K/UL (ref 150–400)
PMV BLD AUTO: 9.6 FL (ref 8.9–12.9)
POTASSIUM SERPL-SCNC: 3.7 MMOL/L (ref 3.5–5.1)
PROT SERPL-MCNC: 7.2 G/DL (ref 6.4–8.2)
PROT UR STRIP-MCNC: 30 MG/DL
RBC # BLD AUTO: 4.75 M/UL (ref 4.1–5.7)
RBC #/AREA URNS HPF: ABNORMAL /HPF (ref 0–5)
SODIUM SERPL-SCNC: 141 MMOL/L (ref 136–145)
SP GR UR REFRACTOMETRY: >1.03 (ref 1–1.03)
TRIGL SERPL-MCNC: 107 MG/DL
URINE CULTURE IF INDICATED: ABNORMAL
UROBILINOGEN UR QL STRIP.AUTO: 0.1 EU/DL (ref 0.1–1)
VLDLC SERPL CALC-MCNC: 21.4 MG/DL
WBC # BLD AUTO: 11 K/UL (ref 4.1–11.1)
WBC URNS QL MICRO: ABNORMAL /HPF (ref 0–4)

## 2024-12-01 PROCEDURE — 6370000000 HC RX 637 (ALT 250 FOR IP)

## 2024-12-01 PROCEDURE — 4A03X5D MEASUREMENT OF ARTERIAL FLOW, INTRACRANIAL, EXTERNAL APPROACH: ICD-10-PCS | Performed by: INTERNAL MEDICINE

## 2024-12-01 PROCEDURE — 82248 BILIRUBIN DIRECT: CPT

## 2024-12-01 PROCEDURE — 85379 FIBRIN DEGRADATION QUANT: CPT

## 2024-12-01 PROCEDURE — 6370000000 HC RX 637 (ALT 250 FOR IP): Performed by: STUDENT IN AN ORGANIZED HEALTH CARE EDUCATION/TRAINING PROGRAM

## 2024-12-01 PROCEDURE — 2580000003 HC RX 258: Performed by: STUDENT IN AN ORGANIZED HEALTH CARE EDUCATION/TRAINING PROGRAM

## 2024-12-01 PROCEDURE — 83036 HEMOGLOBIN GLYCOSYLATED A1C: CPT

## 2024-12-01 PROCEDURE — 97163 PT EVAL HIGH COMPLEX 45 MIN: CPT

## 2024-12-01 PROCEDURE — 87040 BLOOD CULTURE FOR BACTERIA: CPT

## 2024-12-01 PROCEDURE — 1100000000 HC RM PRIVATE

## 2024-12-01 PROCEDURE — 97530 THERAPEUTIC ACTIVITIES: CPT

## 2024-12-01 PROCEDURE — 6370000000 HC RX 637 (ALT 250 FOR IP): Performed by: INTERNAL MEDICINE

## 2024-12-01 PROCEDURE — 85025 COMPLETE CBC W/AUTO DIFF WBC: CPT

## 2024-12-01 PROCEDURE — 80061 LIPID PANEL: CPT

## 2024-12-01 PROCEDURE — 80053 COMPREHEN METABOLIC PANEL: CPT

## 2024-12-01 PROCEDURE — 6360000002 HC RX W HCPCS

## 2024-12-01 PROCEDURE — 92610 EVALUATE SWALLOWING FUNCTION: CPT

## 2024-12-01 PROCEDURE — 81001 URINALYSIS AUTO W/SCOPE: CPT

## 2024-12-01 PROCEDURE — 2580000003 HC RX 258

## 2024-12-01 PROCEDURE — 36415 COLL VENOUS BLD VENIPUNCTURE: CPT

## 2024-12-01 RX ORDER — ASPIRIN 81 MG/1
81 TABLET, CHEWABLE ORAL DAILY
Status: DISCONTINUED | OUTPATIENT
Start: 2024-12-01 | End: 2024-12-05 | Stop reason: HOSPADM

## 2024-12-01 RX ADMIN — HYDROMORPHONE HYDROCHLORIDE 0.25 MG: 1 INJECTION, SOLUTION INTRAMUSCULAR; INTRAVENOUS; SUBCUTANEOUS at 05:39

## 2024-12-01 RX ADMIN — PANTOPRAZOLE SODIUM 40 MG: 40 TABLET, DELAYED RELEASE ORAL at 05:41

## 2024-12-01 RX ADMIN — ASPIRIN 81 MG 81 MG: 81 TABLET ORAL at 09:51

## 2024-12-01 RX ADMIN — ACETAMINOPHEN 650 MG: 325 TABLET ORAL at 20:59

## 2024-12-01 RX ADMIN — SODIUM CHLORIDE, PRESERVATIVE FREE 10 ML: 5 INJECTION INTRAVENOUS at 08:45

## 2024-12-01 RX ADMIN — Medication 10 MG: at 19:51

## 2024-12-01 RX ADMIN — ATORVASTATIN CALCIUM 80 MG: 40 TABLET, FILM COATED ORAL at 19:51

## 2024-12-01 RX ADMIN — SODIUM CHLORIDE, PRESERVATIVE FREE 10 ML: 5 INJECTION INTRAVENOUS at 19:53

## 2024-12-01 ASSESSMENT — PAIN SCALES - GENERAL
PAINLEVEL_OUTOF10: 0
PAINLEVEL_OUTOF10: 0
PAINLEVEL_OUTOF10: 10
PAINLEVEL_OUTOF10: 0
PAINLEVEL_OUTOF10: 0

## 2024-12-01 ASSESSMENT — PAIN DESCRIPTION - DESCRIPTORS: DESCRIPTORS: ACHING;THROBBING

## 2024-12-01 ASSESSMENT — PAIN DESCRIPTION - LOCATION: LOCATION: LEG

## 2024-12-01 ASSESSMENT — PAIN DESCRIPTION - ORIENTATION: ORIENTATION: LEFT

## 2024-12-01 NOTE — SIGNIFICANT EVENT
Alerted by nursing that patient developed sudden left upper extremity weakness and left facial droop.  Patient assessed at the bedside.  Left upper extremity flaccid patient unable to hold up against gravity.  Mild left-sided facial droop.  PERRL, EOMI.  5 out of 5 strength right upper and lower extremity.  Strength in left lower extremity unable to be assessed as patient had surgery today and with left hip pain.  Patient alert, oriented to time, place.  Oriented to person but answers incorrect age.  Vital signs notable for tachycardia, blood pressure 156/90.  Stroke alert called.  Stat CT of the head and CTA ordered.  Discussed with teleneurology, no acute CVA noted on CT of the head and head and neck, old appearing infarcts per their read.  Recommended further CVA workup with MRI of the brain.  A1c and lipid panel ordered.  PT/OT/SLP.  Echo.  Patient started on high intensity statin.  Aspirin deferred as patient had surgery today.

## 2024-12-01 NOTE — PROGRESS NOTES
mg/dL    Total Bilirubin 3.2 (H) 0.2 - 1.0 mg/dL    AST 35 15 - 37 U/L    ALT 21 12 - 78 U/L    Alk Phosphatase 89 45 - 117 U/L    Total Protein 7.2 6.4 - 8.2 g/dL    Albumin 3.0 (L) 3.5 - 5.0 g/dL    Globulin 4.2 (H) 2.0 - 4.0 g/dL    Albumin/Globulin Ratio 0.7 (L) 1.1 - 2.2         CTA HEAD NECK W WO CONTRAST   Final Result   No acute intracranial abnormality.         CLINICAL HISTORY: Left-sided weakness      EXAMINATION:  CT ANGIOGRAPHY HEAD AND NECK, CT PERFUSION      COMPARISON: November 29, 2024      TECHNIQUE:  Following the uneventful administration of iodinated contrast   material, axial CT angiography of the head and neck was performed. Delayed axial   images through the head were also obtained. Coronal and sagittal reconstructions   were obtained. Manual postprocessing of images was performed. 3-D  Sagittal   maximal intensity projection images were obtained.  3-D Coronal maximal   intensity projections were obtained. CT brain perfusion was performed with   generation of hemodynamic maps of multiple parameters, including cerebral blood   flow, cerebral blood volume, mean transit time, and TMAX. CT dose reduction was   achieved through use of a standardized protocol tailored for this examination   and automatic exposure control for dose modulation. This study was analyzed by   the [employer] algorithm.      FINDINGS:      Delayed contrast-enhanced head CT:      The ventricles are midline without hydrocephalus.  There is no acute intra or   extra-axial hemorrhage. Periventricular white matter hypodensities indicative of   chronic microvascular angiopathy. The basal cisterns are clear.  The paranasal   sinuses are clear.      CTA NECK:      Great vessels: Normal arch anatomy with the origins patent.      Right subclavian artery: Patent      Left subclavian artery: Patent       Right common carotid artery: Patent       Left common carotid artery: Patent       Cervical right internal carotid artery: Mild  cerebral   arteries bilaterally right greater than left.      Basilar artery: Patent      Distal vertebral arteries: Patent            CT Perfusion:   No manage perfusion defect.      IMPRESSION:    No large vessel occlusion or perfusion abnormality.   Intracranial atherosclerosis affecting the anterior, left middle, and posterior   cerebral arteries, worst in the right PCA distribution.   No hemodynamically significant carotid stenosis.           Electronically signed by KERRY CASTAÑEDA      CT HEAD WO CONTRAST   Final Result   No acute intracranial abnormality.         CLINICAL HISTORY: Left-sided weakness      EXAMINATION:  CT ANGIOGRAPHY HEAD AND NECK, CT PERFUSION      COMPARISON: November 29, 2024      TECHNIQUE:  Following the uneventful administration of iodinated contrast   material, axial CT angiography of the head and neck was performed. Delayed axial   images through the head were also obtained. Coronal and sagittal reconstructions   were obtained. Manual postprocessing of images was performed. 3-D  Sagittal   maximal intensity projection images were obtained.  3-D Coronal maximal   intensity projections were obtained. CT brain perfusion was performed with   generation of hemodynamic maps of multiple parameters, including cerebral blood   flow, cerebral blood volume, mean transit time, and TMAX. CT dose reduction was   achieved through use of a standardized protocol tailored for this examination   and automatic exposure control for dose modulation. This study was analyzed by   the [employer] algorithm.      FINDINGS:      Delayed contrast-enhanced head CT:      The ventricles are midline without hydrocephalus.  There is no acute intra or   extra-axial hemorrhage. Periventricular white matter hypodensities indicative of   chronic microvascular angiopathy. The basal cisterns are clear.  The paranasal   sinuses are clear.      CTA NECK:      Great vessels: Normal arch anatomy with the origins patent.      Right

## 2024-12-01 NOTE — PROGRESS NOTES
1900 New onset left sided weakness s/p left hemiarthroplasty. Stroke alert activated. Binu OWENS at bedside.     1950 CT head completed

## 2024-12-01 NOTE — PROGRESS NOTES
Orthopedic Progress Note    Date:2024       Room:[facility]  Patient Name:Grant Jacobs     YOB: 1940     Age:84 y.o.  male     SUBJECTIVE    POD 1 s/p uncomplicated left hip hemiarthroplasty for left femoral neck fracture.  Patient is seen today for follow-up.       Last night patient reportedly developed acute left upper extremity weakness and left facial droop.  He was noted to have a flaccid left arm and mild left-sided facial droop on exam.  Head CT and CTA of the head and neck were obtained by the primary team, came back negative for acute stroke or large vessel occlusion.  He is awaiting an MRI of the brain today.    VITALS         Temp  Av.9 °F (37.2 °C)  Min: 97.7 °F (36.5 °C)  Max: 100.9 °F (38.3 °C)  Pulse  Av.9  Min: 66  Max: 124  Systolic (24hrs), Av , Min:61 , Max:175    Diastolic (24hrs), Av, Min:46, Max:98    Resp  Av.1  Min: 16  Max: 25  SpO2  Av.5 %  Min: 90 %  Max: 97 %  LABS      Recent Labs     24  1443 24  0508 24  0800   WBC 7.1 9.1 11.0   RBC 5.18 4.81 4.75   HGB 15.8 14.6 14.5   HCT 46.7 42.9 44.3   MCV 90.2 89.2 93.3   RDW 13.0 13.1 13.5    206 185     Recent Labs     24  1443 24  0508 24  0800    141 141   K 3.9 3.7 3.7   * 111* 111*   CO2 28 25 26   BUN 22* 16 20   GLUCOSE 146* 132* 124*   PT/INR:  Recent Labs     24  1443   PROTIME 13.5   INR 1.0     ESR: --   No results found for: \"CRP\", \"CRPM\"   Results       Procedure Component Value Units Date/Time    Culture, Blood 1 [[id number]] Collected: 24 0404    Order Status: Completed Specimen: Blood Updated: 24     Special Requests --        No Special Requests  Left  Right  Forearm  Hand       Culture No growth after 4 hours       Culture, Blood 2 [[id number]] Collected: 24 0404    Order Status: Completed Specimen: Blood Updated: 24     Special Requests No Special Requests        Culture No growth after 4

## 2024-12-01 NOTE — PLAN OF CARE
weight bearing restrictions limiting activity or patient is unable to maintain.    IF patient discharges home will need the following DME: continuing to assess with progress       SUBJECTIVE:   Patient stated “I am ok.”    OBJECTIVE DATA SUMMARY:   HISTORY:    No past medical history on file.No past surgical history on file.    Home Situation:  Social/Functional History  Lives With: Spouse  Type of Home: House  ADL Assistance: Independent  Homemaking Assistance: Independent  Ambulation Assistance: Independent  Transfer Assistance: Independent    Cognitive/Behavioral Status:  Orientation  Orientation Level: Oriented X4  Cognition  Overall Cognitive Status: WFL    Skin: dressing intact    Edema: left leg    Strength:    Strength: Generally decreased, functional    Range Of Motion:  AROM: Generally decreased, functional       Coordination:        Tone & Sensation:   Tone: Normal         Functional Mobility:  Bed Mobility:     Bed Mobility Training  Bed Mobility Training: Yes  Overall Level of Assistance: Maximum assistance;Total assistance;Additional time;Assist X1;Assist X2  Interventions: Safety awareness training;Verbal cues;Weight shifting training/pressure relief  Rolling: Assist X2;Additional time;Maximum assistance  Supine to Sit: Maximum assistance;Assist X1;Additional time  Sit to Supine: Maximum assistance;Additional time;Assist X1;Assist X2  Scooting: Maximum assistance;Additional time;Assist X2  Transfers:     Transfer Training  Transfer Training: No  Balance:               Balance  Sitting: Impaired  Sitting - Static: Poor (constant support)  Sitting - Dynamic: Poor (constant support)  Wheelchair Mobility:        Ambulation/Gait Training:                                Gait  Gait Training: No                   Therapeutic Intervention provided:   bed mobility , EOB transfers, OOB transfers, amb with AD, LE therapeutic exercises, seated dynamic balance, and standing dynamic balance    Functional

## 2024-12-01 NOTE — PLAN OF CARE
Speech LAnguage Pathology Dysphagia EVALUATION    Patient: Grant Jacobs (84 y.o. male)  Date: 12/1/2024  Primary Diagnosis: Fracture [T14.8XXA]  Closed fracture of left hip, initial encounter ([facility]) [S72.002A]  Procedure(s) (LRB):  HIP HEMIARTHROPLASTY (Left) 1 Day Post-Op   Precautions: Aspiration                    Time In: 1120  Time Out: 1140    DIET RECOMMENDATIONS: Minced and moist and thin liquids, meds whole with thin liquid as tolerated    SWALLOW SAFETY PRECAUTIONS:   1:1 Supervision as needed to ensure slow rate of intake / small bites. May also need assist w/feeding.   Check for oral pocketing.   Remain upright 30 minutes after PO intake.  Meds whole w/thin liquid as tolerated.    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 Upright in bed.   Some confusion--provided conflicting answers at times.  Mostly repeated SLP questions.   Communication mostly 1-2 word phrases.  Inconsistently follows one-step commands.  Delayed response time.  Indicated \"some\" difficulty hearing.  Mild dysarthria, mildly reduced vocal intensity w/moderately reduced intelligibility    Oral mucosa pink, moist.   Significantly reduced dentition. Reports plan to pull teeth and get dentures.   Did not phonate on command.   Left sided labial reduced ROM, significant.   Lingual ROM/coordination during lateralization/protrusion WFL  Mandibular ROM mildly reduced, behavioral vs deficit. Coordination WFL.  +Volitional swallow.     Breakfast tray present.

## 2024-12-01 NOTE — PLAN OF CARE
Problem: Pain  Goal: Verbalizes/displays adequate comfort level or baseline comfort level  Outcome: Progressing     Problem: Skin/Tissue Integrity  Goal: Absence of new skin breakdown  Description: 1.  Monitor for areas of redness and/or skin breakdown  2.  Assess vascular access sites hourly  3.  Every 4-6 hours minimum:  Change oxygen saturation probe site  4.  Every 4-6 hours:  If on nasal continuous positive airway pressure, respiratory therapy assess nares and determine need for appliance change or resting period.  Outcome: Progressing     Problem: Safety - Adult  Goal: Free from fall injury  Outcome: Progressing     Problem: Discharge Planning  Goal: Discharge to home or other facility with appropriate resources  Outcome: Progressing

## 2024-12-02 ENCOUNTER — APPOINTMENT (OUTPATIENT)
Facility: HOSPITAL | Age: 84
DRG: 521 | End: 2024-12-02
Payer: MEDICARE

## 2024-12-02 LAB
ALBUMIN SERPL-MCNC: 2.6 G/DL (ref 3.5–5)
ALBUMIN/GLOB SERPL: 0.6 (ref 1.1–2.2)
ALP SERPL-CCNC: 81 U/L (ref 45–117)
ALT SERPL-CCNC: 17 U/L (ref 12–78)
ANION GAP SERPL CALC-SCNC: 8 MMOL/L (ref 2–12)
AST SERPL W P-5'-P-CCNC: 28 U/L (ref 15–37)
BASOPHILS # BLD: 0 K/UL (ref 0–0.1)
BASOPHILS NFR BLD: 0 % (ref 0–1)
BILIRUB SERPL-MCNC: 2.3 MG/DL (ref 0.2–1)
BUN SERPL-MCNC: 21 MG/DL (ref 6–20)
BUN/CREAT SERPL: 19 (ref 12–20)
CA-I BLD-MCNC: 8.4 MG/DL (ref 8.5–10.1)
CHLORIDE SERPL-SCNC: 107 MMOL/L (ref 97–108)
CO2 SERPL-SCNC: 23 MMOL/L (ref 21–32)
CREAT SERPL-MCNC: 1.09 MG/DL (ref 0.7–1.3)
DIFFERENTIAL METHOD BLD: ABNORMAL
ECHO BSA: 1.91 M2
ECHO LV EF PHYSICIAN: 55 %
EOSINOPHIL # BLD: 0.1 K/UL (ref 0–0.4)
EOSINOPHIL NFR BLD: 1 % (ref 0–7)
ERYTHROCYTE [DISTWIDTH] IN BLOOD BY AUTOMATED COUNT: 13.5 % (ref 11.5–14.5)
GLOBULIN SER CALC-MCNC: 4.2 G/DL (ref 2–4)
GLUCOSE SERPL-MCNC: 124 MG/DL (ref 65–100)
HCT VFR BLD AUTO: 38.4 % (ref 36.6–50.3)
HGB BLD-MCNC: 12.9 G/DL (ref 12.1–17)
IMM GRANULOCYTES # BLD AUTO: 0 K/UL (ref 0–0.04)
IMM GRANULOCYTES NFR BLD AUTO: 0 % (ref 0–0.5)
LYMPHOCYTES # BLD: 1.4 K/UL (ref 0.8–3.5)
LYMPHOCYTES NFR BLD: 13 % (ref 12–49)
MCH RBC QN AUTO: 30.4 PG (ref 26–34)
MCHC RBC AUTO-ENTMCNC: 33.6 G/DL (ref 30–36.5)
MCV RBC AUTO: 90.4 FL (ref 80–99)
MONOCYTES # BLD: 0.7 K/UL (ref 0–1)
MONOCYTES NFR BLD: 7 % (ref 5–13)
NEUTS SEG # BLD: 8 K/UL (ref 1.8–8)
NEUTS SEG NFR BLD: 79 % (ref 32–75)
NRBC # BLD: 0 K/UL (ref 0–0.01)
NRBC BLD-RTO: 0 PER 100 WBC
PLATELET # BLD AUTO: 198 K/UL (ref 150–400)
PMV BLD AUTO: 9.9 FL (ref 8.9–12.9)
POTASSIUM SERPL-SCNC: 3.6 MMOL/L (ref 3.5–5.1)
PROT SERPL-MCNC: 6.8 G/DL (ref 6.4–8.2)
RBC # BLD AUTO: 4.25 M/UL (ref 4.1–5.7)
SODIUM SERPL-SCNC: 138 MMOL/L (ref 136–145)
WBC # BLD AUTO: 10.1 K/UL (ref 4.1–11.1)

## 2024-12-02 PROCEDURE — 85025 COMPLETE CBC W/AUTO DIFF WBC: CPT

## 2024-12-02 PROCEDURE — 97530 THERAPEUTIC ACTIVITIES: CPT

## 2024-12-02 PROCEDURE — 97165 OT EVAL LOW COMPLEX 30 MIN: CPT

## 2024-12-02 PROCEDURE — 6370000000 HC RX 637 (ALT 250 FOR IP): Performed by: INTERNAL MEDICINE

## 2024-12-02 PROCEDURE — 2580000003 HC RX 258: Performed by: STUDENT IN AN ORGANIZED HEALTH CARE EDUCATION/TRAINING PROGRAM

## 2024-12-02 PROCEDURE — 36415 COLL VENOUS BLD VENIPUNCTURE: CPT

## 2024-12-02 PROCEDURE — 6370000000 HC RX 637 (ALT 250 FOR IP): Performed by: STUDENT IN AN ORGANIZED HEALTH CARE EDUCATION/TRAINING PROGRAM

## 2024-12-02 PROCEDURE — 99024 POSTOP FOLLOW-UP VISIT: CPT

## 2024-12-02 PROCEDURE — 70551 MRI BRAIN STEM W/O DYE: CPT

## 2024-12-02 PROCEDURE — 80053 COMPREHEN METABOLIC PANEL: CPT

## 2024-12-02 PROCEDURE — 1100000000 HC RM PRIVATE

## 2024-12-02 PROCEDURE — 93308 TTE F-UP OR LMTD: CPT

## 2024-12-02 RX ORDER — CLOPIDOGREL BISULFATE 75 MG/1
75 TABLET ORAL DAILY
Status: DISCONTINUED | OUTPATIENT
Start: 2024-12-03 | End: 2024-12-05 | Stop reason: HOSPADM

## 2024-12-02 RX ORDER — OXYCODONE AND ACETAMINOPHEN 5; 325 MG/1; MG/1
1 TABLET ORAL EVERY 4 HOURS PRN
Status: DISCONTINUED | OUTPATIENT
Start: 2024-12-02 | End: 2024-12-05 | Stop reason: HOSPADM

## 2024-12-02 RX ORDER — CLOPIDOGREL BISULFATE 75 MG/1
75 TABLET ORAL DAILY
Status: DISCONTINUED | OUTPATIENT
Start: 2024-12-02 | End: 2024-12-02

## 2024-12-02 RX ORDER — AMLODIPINE BESYLATE 5 MG/1
5 TABLET ORAL DAILY
Status: DISCONTINUED | OUTPATIENT
Start: 2024-12-02 | End: 2024-12-05 | Stop reason: HOSPADM

## 2024-12-02 RX ADMIN — CLOPIDOGREL BISULFATE 75 MG: 75 TABLET ORAL at 09:59

## 2024-12-02 RX ADMIN — PANTOPRAZOLE SODIUM 40 MG: 40 TABLET, DELAYED RELEASE ORAL at 05:27

## 2024-12-02 RX ADMIN — SODIUM CHLORIDE, PRESERVATIVE FREE 10 ML: 5 INJECTION INTRAVENOUS at 09:59

## 2024-12-02 RX ADMIN — SODIUM CHLORIDE, PRESERVATIVE FREE 10 ML: 5 INJECTION INTRAVENOUS at 20:52

## 2024-12-02 RX ADMIN — ATORVASTATIN CALCIUM 80 MG: 40 TABLET, FILM COATED ORAL at 20:52

## 2024-12-02 RX ADMIN — OXYCODONE HYDROCHLORIDE AND ACETAMINOPHEN 1 TABLET: 5; 325 TABLET ORAL at 11:02

## 2024-12-02 RX ADMIN — OXYCODONE HYDROCHLORIDE AND ACETAMINOPHEN 1 TABLET: 5; 325 TABLET ORAL at 18:51

## 2024-12-02 RX ADMIN — AMLODIPINE BESYLATE 5 MG: 5 TABLET ORAL at 09:59

## 2024-12-02 RX ADMIN — ASPIRIN 81 MG 81 MG: 81 TABLET ORAL at 09:59

## 2024-12-02 ASSESSMENT — PAIN SCALES - GENERAL
PAINLEVEL_OUTOF10: 0
PAINLEVEL_OUTOF10: 7
PAINLEVEL_OUTOF10: 8
PAINLEVEL_OUTOF10: 0
PAINLEVEL_OUTOF10: 8
PAINLEVEL_OUTOF10: 4
PAINLEVEL_OUTOF10: 8
PAINLEVEL_OUTOF10: 0

## 2024-12-02 ASSESSMENT — PAIN DESCRIPTION - DESCRIPTORS
DESCRIPTORS: ACHING

## 2024-12-02 ASSESSMENT — PAIN DESCRIPTION - ORIENTATION
ORIENTATION: LEFT

## 2024-12-02 ASSESSMENT — PAIN DESCRIPTION - LOCATION
LOCATION: HIP

## 2024-12-02 NOTE — PLAN OF CARE
training;Tactile cues;Verbal cues  Rolling: Maximum assistance;Assist X2;Additional time  Supine to Sit: Maximum assistance;Assist X2;Additional time  Sit to Supine: Maximum assistance;Assist X2;Additional time  Scooting: Maximum assistance;Assist X2;Additional time  Transfers:  Transfer Training  Transfer Training: Yes  Interventions: Verbal cues;Safety awareness training;Manual cues;Tactile cues;Visual cues;Weight shifting training/pressure relief  Sit to Stand: Maximum assistance;Total assistance;Assist X2;Additional time;Adaptive equipment  Stand to Sit: Maximum assistance;Total assistance;Assist X2;Additional time;Adaptive equipment  Balance:  Balance  Sitting: Impaired;With support  Sitting - Static: Fair (occasional);Supported sitting  Sitting - Dynamic: Poor (constant support);Supported sitting  Standing: Impaired;With support  Standing - Static: Constant support;Poor  Standing - Dynamic: Not tested   Wheelchair Mobility:   Wheelchair Management  Wheelchair Management: No  Ambulation/Gait Training:  Gait  Gait Training: No    Therapeutic Exercises:   bed mobility , EOB transfers, OOB transfers, seated static and dynamic balance, and standing static and dynamic balance    Pain Ratin/10 bilateral hip pain on FACES scale  Pain Intervention(s):   nursing notified, rest, and repositioning    Activity Tolerance:   Fair , requires frequent rest breaks, and SpO2 stable on room air    After treatment patient left in no apparent distress:   Bed locked and returned to lowest position, Patient left in no apparent distress in bed, Call bell within reach, Bed/ chair alarm activated, Side rails x3, and Updated patient's board on functional status and mobility recommendations, and nsg updated       COMMUNICATION/COLLABORATION:   The patient’s plan of care was discussed with: Registered nurse and Case management    Patient Education  Education Given To: Patient  Education Provided: Equipment;ADL Adaptive  Strategies;Precautions;Transfer Training;Fall Prevention Strategies  Education Provided Comments: Patient educated on posterior hip precations and sequencing for bed mobility and transfers  Education Method: Demonstration;Verbal;Teach Back  Barriers to Learning: Cognition  Education Outcome: Continued education needed    CRISTOBAL Billy, under direct supervision of Nunu Odonnell, PT, DPT provided care and treatment of pt with verbal consent from pt received.      CRISTOBAL Billy, DPT  Minutes: 24

## 2024-12-02 NOTE — PROGRESS NOTES
Pt had a stroke alert due to L sided weakness. Had a STAT CT scan of the head, neck and brain perfusion all were negative. With Pending MRI, 2D Echo & tele neuro consult.  Neuro checks and VS q4 done. Pt noted to have a Temp of 100.9. PRN Tylenol provided, tepid sponge bath and a cool wash cloth to forehead. On call provider made aware to which STAT blood cultures, D Dimer and UA were ordered.    Nursing to continue monitoring pt.

## 2024-12-02 NOTE — CARE COORDINATION
CM reviewed chart.CM met with patient at the bedside to obtain preference. Patient preference is [facility]. Referral sent. CM will continue to follow.

## 2024-12-02 NOTE — PLAN OF CARE
shower     Bathroom Equipment: Grab bars in shower, Shower chair     Home Equipment: Cane, Walker - Rolling     Social/Functional History  Lives With: Other (comment) (Brother and sister in law)  Type of Home: House  Home Layout: One level  Home Access: Stairs to enter with rails  Entrance Stairs - Number of Steps: 6  Entrance Stairs - Rails: Right  Bathroom Shower/Tub: Shower chair with back, Walk-in shower  Bathroom Equipment: Grab bars in shower, Shower chair  Home Equipment: Cane, Walker - Rolling  ADL Assistance: Independent  Homemaking Assistance: Independent  Ambulation Assistance: Independent  Transfer Assistance: Independent    Hand Dominance: right     EXAMINATION OF PERFORMANCE DEFICITS:    Cognitive/Behavioral Status:  Orientation  Orientation Level: Oriented X4  Cognition  Overall Cognitive Status: Exceptions  Arousal/Alertness: Delayed responses to stimuli  Following Commands: Follows one step commands with increased time;Follows one step commands with repetition  Attention Span: Difficulty dividing attention  Safety Judgement: Decreased awareness of need for safety;Decreased awareness of need for assistance  Problem Solving: Decreased awareness of errors;Assistance required to correct errors made;Assistance required to identify errors made;Assistance required to implement solutions;Assistance required to generate solutions  Insights: Decreased awareness of deficits  Initiation: Requires cues for all  Sequencing: Requires cues for all    Vision/Perceptual:          Vision  Vision: Impaired  Vision Exceptions: Wears glasses at all times;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 LUE flaccid)       Strength:    Strength: Generally decreased,  []  3 []  4   2.  Bathing (including washing, rinsing, drying)? []  1 [x]  2 []  3 []  4   3.  Toileting, which includes using toilet, bedpan or urinal? [x] 1 []  2 []  3 []  4   4.  Putting on and taking off regular upper body clothing? []  1 [x]  2 []  3 []  4   5.  Taking care of personal grooming such as brushing teeth? []  1 []  2 [x]  3 []  4   6.  Eating meals? []  1 []  2 [x]  3 []  4   © , Trustees of [facility], under license to [employer]. All rights reserved     Score:      Interpretation of Tool:  Represents clinically-significant functional categories (i.e. Activities of daily living).  Percentage of Impairment CH    0%   CI    1-19% CJ    20-39% CK    40-59% CL    60-79% CM    80-99% CN     100%   [facility]  Score 6-24 24 23 20-22 15-19 10-14 7-9 6     Occupational Therapy Evaluation Charge Determination   History Examination Decision-Making   LOW Complexity : Brief history review  HIGH Complexity: 5 Performance deficits relating to physical, cognitive, or psychosocial skills that result in activity limitations and/or participation restrictions  HIGH Complexity: Patient presents with comorbidities that affect occupational performance.  Significant modifications of tasks or assistance (eg. physical or verbal) with assessment (s) is necessary to enable pt to complete evaluation      Based on the above components, the patient evaluation is determined to be of the following complexity level: Low    Pain Ratin/10   Pain Intervention(s):       Activity Tolerance:   Fair  and requires rest breaks    After treatment patient left in no apparent distress:    Patient left in no apparent distress in bed, Call bell within reach, Bed/ chair alarm activated, Side rails x3, and Updated patient's board on functional status and mobility recommendations, bed locked and in lowest position    COMMUNICATION/EDUCATION:   The patient’s plan of care was discussed with: Physical therapist and Registered

## 2024-12-02 NOTE — PROGRESS NOTES
Orthopedic Progress Note    Date:2024       Room:[facility]  Patient Name:Grant Jacobs     YOB: 1940     Age:84 y.o.  male     SUBJECTIVE    2024: POD 1 s/p uncomplicated left hip hemiarthroplasty for left femoral neck fracture.  Patient is seen today for follow-up.       Last night patient reportedly developed acute left upper extremity weakness and left facial droop.  He was noted to have a flaccid left arm and mild left-sided facial droop on exam.  Head CT and CTA of the head and neck were obtained by the primary team, came back negative for acute stroke or large vessel occlusion.  He is awaiting an MRI of the brain today.    2024: POD #2 uncomplicated left hip hemiarthroplasty for left femoral neck fracture.  Patient is resting comfortably in bed on time of rounds.  He reports pain to the hip only with movement.  He underwent a brain MRI today for a stroke rule out which was obtained by primary team.  VITALS         Temp  Av.1 °F (37.3 °C)  Min: 97.5 °F (36.4 °C)  Max: 100.2 °F (37.9 °C)  Pulse  Av.1  Min: 99  Max: 114  Systolic (24hrs), Avg:160 , Min:140 , Max:178    Diastolic (24hrs), Av, Min:66, Max:97    Resp  Av.6  Min: 17  Max: 24  SpO2  Av.6 %  Min: 90 %  Max: 95 %  LABS      Recent Labs     24  0508 24  0800 24  1227   WBC 9.1 11.0 10.1   RBC 4.81 4.75 4.25   HGB 14.6 14.5 12.9   HCT 42.9 44.3 38.4   MCV 89.2 93.3 90.4   RDW 13.1 13.5 13.5    185 198     Recent Labs     24  0508 24  0800 24  1227    141 138   K 3.7 3.7 3.6   * 111* 107   CO2 25 26 23   BUN 16 20 21*   GLUCOSE 132* 124* 124*   PT/INR:  Recent Labs     24  1443   PROTIME 13.5   INR 1.0     ESR: --   No results found for: \"CRP\", \"CRPM\"   Results       Procedure Component Value Units Date/Time    Culture, Blood 1 [[id number]] Collected: 24    Order Status: Completed Specimen: Blood Updated: 24     Special

## 2024-12-02 NOTE — PROGRESS NOTES
Physician Progress Note      PATIENT:               SHIRLEY BORREGO  CSN #:                  [id number]  :                       1940  ADMIT DATE:       2024 2:07 PM  DISCH DATE:  RESPONDING  PROVIDER #:        Ganesh Bryan MD          QUERY TEXT:    Pt admitted with left hip fracture. Pt noted to have osteopenia consult .   If possible, please document in progress notes and discharge summary if you   are evaluating and/or treating any of the following:    The medical record reflects the following:  Risk Factors: Advanced age 84 yrs old male, osteopenia, fractur.  Clinical Indicators: H&P - Left hip fracture  -Patient presented due to a fall  -X-ray left hip: Acute left hip fracture.  Orthopedic consult- -Bones are osteopenia.  Treatment: Hip hemiarthroplasty, Orthopedic consulted.  Options provided:  -- Pathological left hip fracture due to osteopenia following fall which would   not usually break a normal, healthy bone  -- Traumatic left hip fracture  -- Other - I will add my own diagnosis  -- Disagree - Not applicable / Not valid  -- Disagree - Clinically unable to determine / Unknown  -- Refer to Clinical Documentation Reviewer    PROVIDER RESPONSE TEXT:    This patient has a traumatic left hip fracture.    Query created by: Marian Oswald on 2024 6:19 AM      Electronically signed by:  Ganesh Bryan MD 2024 9:41 AM

## 2024-12-02 NOTE — PROGRESS NOTES
Hospitalist Progress Note               Daily Progress Note: 12/2/2024      Hospital Day: 4     Chief complaint:   Chief Complaint   Patient presents with    Fall        Subjective:   Hospital course to date:    Mr. Jacobs is an 84-year-old male with history of hypertension who presented to the emergency department 11/29 after mechanical ground-level fall.  Patient states that he fell in the kitchen. No syncopal episode. Did not have episodes of chest pain/palpitations afterwards. Denies head trauma. He has had chronic left knee pain and gradual difficulty ambulating. Otherwise, he states that he is fairly healthy and is not taking any daily medicines as he self discontinued his antihypertensives.      In the ED trauma bravo workup obtained.  Vital signs notable for uncontrolled hypertension.  Labs without leukocytosis, anemia.  CMP notable for creatinine of 1.32, elevated total bilirubin 1.6.  CT of the head negative for acute infarct.  CT C-spine negative for fracture.  Left hip x-ray showed acute left femoral neck fracture, confirmed by CT left hip.  Orthopedics consulted and plan for surgical intervention.      Patient subsequently admitted to hospitalist service.  Ultrasound of the abdomen obtained given hyperbilirubinemia, cholecystolithiasis showed no evidence of acute cholecystitis.      Patient taken to the OR 11/30 for left hip arthroplasty.    On the evening of 11/30 patient developed acute left upper extremity weakness and left facial droop.  He was noted to have a flaccid left arm and mild left-sided facial droop on exam.  Head CT and CTA of the head and neck were obtained, came back negative for acute stroke or large vessel occlusion.  --------  Patient is seen today for follow-up.  He is awake and alert.  Still with dense left hemiparesis    MRI done this morning shows a right temporal ischemic stroke      Medications reviewed  Current Facility-Administered Medications   Medication Dose Route    Posterior communicating arteries: Patent      Posterior cerebral arteries: Mild to moderate stenosis of the posterior cerebral   arteries bilaterally right greater than left.      Basilar artery: Patent      Distal vertebral arteries: Patent            CT Perfusion:   No manage perfusion defect.      IMPRESSION:    No large vessel occlusion or perfusion abnormality.   Intracranial atherosclerosis affecting the anterior, left middle, and posterior   cerebral arteries, worst in the right PCA distribution.   No hemodynamically significant carotid stenosis.           Electronically signed by KERRY CASTAÑEDA      CT BRAIN PERFUSION   Final Result   No acute intracranial abnormality.         CLINICAL HISTORY: Left-sided weakness      EXAMINATION:  CT ANGIOGRAPHY HEAD AND NECK, CT PERFUSION      COMPARISON: November 29, 2024      TECHNIQUE:  Following the uneventful administration of iodinated contrast   material, axial CT angiography of the head and neck was performed. Delayed axial   images through the head were also obtained. Coronal and sagittal reconstructions   were obtained. Manual postprocessing of images was performed. 3-D  Sagittal   maximal intensity projection images were obtained.  3-D Coronal maximal   intensity projections were obtained. CT brain perfusion was performed with   generation of hemodynamic maps of multiple parameters, including cerebral blood   flow, cerebral blood volume, mean transit time, and TMAX. CT dose reduction was   achieved through use of a standardized protocol tailored for this examination   and automatic exposure control for dose modulation. This study was analyzed by   the [employer] algorithm.      FINDINGS:      Delayed contrast-enhanced head CT:      The ventricles are midline without hydrocephalus.  There is no acute intra or   extra-axial hemorrhage. Periventricular white matter hypodensities indicative of   chronic microvascular angiopathy. The basal cisterns are clear.  The

## 2024-12-02 NOTE — CONSULTS
[facility] NEUROLOGY CONSULT   Sushma Manda, MD; Neurohospitalist   Date of Service 12/2/2024       REASON Fracture [T14.8XXA]  Closed fracture of left hip, initial encounter ([facility]) [S72.002A]    ASSESSMENT  L facial droop, flaccid L hemiparesis, LUE>LLE, dysarthria - sequela of acute R corona radiata CVA, in the context of mild distal L MCA M1 stenosis, poorly controlled atherosclerotic risk factors     Hypertension, poorly controlled  Hyperlipidemia   A1c 5.6    POD 2 L hip arthroplasty  Hypersomnolence- altered sleep wake cycle and delayed effects of oxycodone    CT head,CT BRAIN PERFUSION 11/30/2024  Impression  No acute intracranial abnormality.  No large vessel occlusion or perfusion abnormality.  Intracranial atherosclerosis affecting the anterior, left middle, and posterior  cerebral arteries, worst in the right PCA distribution.  No hemodynamically significant carotid stenosis.  Mild left middle cerebral artery narrowing in the  distal M1 segment.  MRI BRAIN WO CONTRAST 12/02/2024  Acute right corona radiata infarct as detailed.  No acute intracranial hemorrhage.  Chronic findings include nonspecific brain atrophy, remote left gangliocapsular  lacunar infarct, and mild microangiopathic white matter changes.     Above pertinent diagnostic studies-I personally reviewed imaging and concur with the reports    PLAN  Changed plavix to 21 days- (plavix-with ortho clearance)  Cont asa 81 mg qday  Cont high dose statin  Risk factor modification   Goal -160/DBP   Minimize sedating medications    If echo is normal and if he is more awake as oxycodone wears off , he is clear from neuro standpoint    Tele- 30 day event monitor if no afib on telemetry  Will follow echo results peripherally  D/w RN      HISTORY OF PRESENT ILLNESS  83 y/o M was admitted on 11/29 s/p  fall. Hx was obtained from chart review. Patient is currently poor historian due to hypersomnolence and dysarthria.  \"he was opening

## 2024-12-02 NOTE — PLAN OF CARE
Problem: Discharge Planning  Goal: Discharge to home or other facility with appropriate resources  Outcome: Progressing     Problem: Pain  Goal: Verbalizes/displays adequate comfort level or baseline comfort level  Outcome: Progressing  Flowsheets (Taken 12/2/2024 0530 by Lori Rizzo RN)  Verbalizes/displays adequate comfort level or baseline comfort level: Encourage patient to monitor pain and request assistance     Problem: Skin/Tissue Integrity  Goal: Absence of new skin breakdown  Description: 1.  Monitor for areas of redness and/or skin breakdown  2.  Assess vascular access sites hourly  3.  Every 4-6 hours minimum:  Change oxygen saturation probe site  4.  Every 4-6 hours:  If on nasal continuous positive airway pressure, respiratory therapy assess nares and determine need for appliance change or resting period.  Outcome: Progressing     Problem: Safety - Adult  Goal: Free from fall injury  Outcome: Progressing

## 2024-12-02 NOTE — PROGRESS NOTES
RRT Clinical Rounding Nurse Progress Report      SUBJECTIVE: Patient assessed secondary to elevated Deterioration Index Score.      Vitals:    12/02/24 0957 12/02/24 1058 12/02/24 1102 12/02/24 1132   BP: (!) 156/92 (!) 162/86     Pulse:  (!) 107     Resp:  17 18 20   Temp: 97.5 °F (36.4 °C) 97.9 °F (36.6 °C)     TempSrc: Oral Oral     SpO2:  90%     Weight:       Height:            DETERIORATION INDEX SCORE: >50    ASSESSMENT:  Called and spoke with ANTHONY Bishop. She will reassess patient at this time.     PLAN:  Will follow per RRT Clinical Rounding Program protocol.    Julianne Vicente RN  [facility]: [phone]  [facility]: [phone]

## 2024-12-02 NOTE — PROGRESS NOTES
Pt had another temp of 100.2F. PRN Tylenol provided, cool wash cloth to forehead and tepid sponge bath done. On call provider made aware, no new orders this time.    Pt has a transfer order to [facility] (Neurology) but with pending bed availability.

## 2024-12-03 LAB
ALBUMIN SERPL-MCNC: 2.5 G/DL (ref 3.5–5)
ALBUMIN/GLOB SERPL: 0.5 (ref 1.1–2.2)
ALP SERPL-CCNC: 83 U/L (ref 45–117)
ALT SERPL-CCNC: 20 U/L (ref 12–78)
ANION GAP SERPL CALC-SCNC: 7 MMOL/L (ref 2–12)
AST SERPL W P-5'-P-CCNC: 29 U/L (ref 15–37)
BASOPHILS # BLD: 0 K/UL (ref 0–0.1)
BASOPHILS NFR BLD: 0 % (ref 0–1)
BILIRUB SERPL-MCNC: 1.7 MG/DL (ref 0.2–1)
BUN SERPL-MCNC: 22 MG/DL (ref 6–20)
BUN/CREAT SERPL: 20 (ref 12–20)
CA-I BLD-MCNC: 8.7 MG/DL (ref 8.5–10.1)
CHLORIDE SERPL-SCNC: 105 MMOL/L (ref 97–108)
CO2 SERPL-SCNC: 26 MMOL/L (ref 21–32)
CREAT SERPL-MCNC: 1.12 MG/DL (ref 0.7–1.3)
DIFFERENTIAL METHOD BLD: ABNORMAL
EOSINOPHIL # BLD: 0.2 K/UL (ref 0–0.4)
EOSINOPHIL NFR BLD: 2 % (ref 0–7)
ERYTHROCYTE [DISTWIDTH] IN BLOOD BY AUTOMATED COUNT: 13.6 % (ref 11.5–14.5)
GLOBULIN SER CALC-MCNC: 4.7 G/DL (ref 2–4)
GLUCOSE BLD STRIP.AUTO-MCNC: 104 MG/DL (ref 65–100)
GLUCOSE BLD STRIP.AUTO-MCNC: 109 MG/DL (ref 65–100)
GLUCOSE SERPL-MCNC: 106 MG/DL (ref 65–100)
HCT VFR BLD AUTO: 38.9 % (ref 36.6–50.3)
HGB BLD-MCNC: 12.9 G/DL (ref 12.1–17)
IMM GRANULOCYTES # BLD AUTO: 0.1 K/UL (ref 0–0.04)
IMM GRANULOCYTES NFR BLD AUTO: 1 % (ref 0–0.5)
LYMPHOCYTES # BLD: 1.6 K/UL (ref 0.8–3.5)
LYMPHOCYTES NFR BLD: 17 % (ref 12–49)
MCH RBC QN AUTO: 30.6 PG (ref 26–34)
MCHC RBC AUTO-ENTMCNC: 33.2 G/DL (ref 30–36.5)
MCV RBC AUTO: 92.2 FL (ref 80–99)
MONOCYTES # BLD: 0.6 K/UL (ref 0–1)
MONOCYTES NFR BLD: 7 % (ref 5–13)
NEUTS SEG # BLD: 6.9 K/UL (ref 1.8–8)
NEUTS SEG NFR BLD: 73 % (ref 32–75)
NRBC # BLD: 0 K/UL (ref 0–0.01)
NRBC BLD-RTO: 0 PER 100 WBC
PERFORMED BY:: ABNORMAL
PERFORMED BY:: ABNORMAL
PLATELET # BLD AUTO: 213 K/UL (ref 150–400)
PMV BLD AUTO: 10 FL (ref 8.9–12.9)
POTASSIUM SERPL-SCNC: 3.5 MMOL/L (ref 3.5–5.1)
PROT SERPL-MCNC: 7.2 G/DL (ref 6.4–8.2)
RBC # BLD AUTO: 4.22 M/UL (ref 4.1–5.7)
SODIUM SERPL-SCNC: 138 MMOL/L (ref 136–145)
WBC # BLD AUTO: 9.4 K/UL (ref 4.1–11.1)

## 2024-12-03 PROCEDURE — 80053 COMPREHEN METABOLIC PANEL: CPT

## 2024-12-03 PROCEDURE — 36415 COLL VENOUS BLD VENIPUNCTURE: CPT

## 2024-12-03 PROCEDURE — 92523 SPEECH SOUND LANG COMPREHEN: CPT

## 2024-12-03 PROCEDURE — 6370000000 HC RX 637 (ALT 250 FOR IP): Performed by: PSYCHIATRY & NEUROLOGY

## 2024-12-03 PROCEDURE — 6370000000 HC RX 637 (ALT 250 FOR IP)

## 2024-12-03 PROCEDURE — 97530 THERAPEUTIC ACTIVITIES: CPT

## 2024-12-03 PROCEDURE — 99024 POSTOP FOLLOW-UP VISIT: CPT

## 2024-12-03 PROCEDURE — 2580000003 HC RX 258: Performed by: STUDENT IN AN ORGANIZED HEALTH CARE EDUCATION/TRAINING PROGRAM

## 2024-12-03 PROCEDURE — 82962 GLUCOSE BLOOD TEST: CPT

## 2024-12-03 PROCEDURE — 6370000000 HC RX 637 (ALT 250 FOR IP): Performed by: INTERNAL MEDICINE

## 2024-12-03 PROCEDURE — 85025 COMPLETE CBC W/AUTO DIFF WBC: CPT

## 2024-12-03 PROCEDURE — 1100000000 HC RM PRIVATE

## 2024-12-03 PROCEDURE — 6370000000 HC RX 637 (ALT 250 FOR IP): Performed by: STUDENT IN AN ORGANIZED HEALTH CARE EDUCATION/TRAINING PROGRAM

## 2024-12-03 RX ORDER — ATORVASTATIN CALCIUM 80 MG/1
80 TABLET, FILM COATED ORAL NIGHTLY
Qty: 30 TABLET | Refills: 3 | Status: SHIPPED | OUTPATIENT
Start: 2024-12-03

## 2024-12-03 RX ORDER — AMLODIPINE BESYLATE 5 MG/1
5 TABLET ORAL DAILY
Qty: 30 TABLET | Refills: 3 | Status: SHIPPED | OUTPATIENT
Start: 2024-12-03

## 2024-12-03 RX ORDER — OXYCODONE AND ACETAMINOPHEN 5; 325 MG/1; MG/1
1 TABLET ORAL EVERY 4 HOURS PRN
Qty: 12 TABLET | Refills: 0 | Status: SHIPPED | OUTPATIENT
Start: 2024-12-03 | End: 2024-12-06

## 2024-12-03 RX ORDER — CLOPIDOGREL BISULFATE 75 MG/1
75 TABLET ORAL DAILY
Qty: 21 TABLET | Refills: 0 | Status: SHIPPED
Start: 2024-12-03 | End: 2024-12-24

## 2024-12-03 RX ORDER — ASPIRIN 81 MG/1
81 TABLET, CHEWABLE ORAL DAILY
Qty: 30 TABLET | Refills: 3 | Status: SHIPPED | OUTPATIENT
Start: 2024-12-03

## 2024-12-03 RX ADMIN — SODIUM CHLORIDE, PRESERVATIVE FREE 10 ML: 5 INJECTION INTRAVENOUS at 19:44

## 2024-12-03 RX ADMIN — ATORVASTATIN CALCIUM 80 MG: 40 TABLET, FILM COATED ORAL at 19:44

## 2024-12-03 RX ADMIN — POLYETHYLENE GLYCOL 3350 17 G: 17 POWDER, FOR SOLUTION ORAL at 19:44

## 2024-12-03 RX ADMIN — AMLODIPINE BESYLATE 5 MG: 5 TABLET ORAL at 08:54

## 2024-12-03 RX ADMIN — PANTOPRAZOLE SODIUM 40 MG: 40 TABLET, DELAYED RELEASE ORAL at 06:19

## 2024-12-03 RX ADMIN — CLOPIDOGREL BISULFATE 75 MG: 75 TABLET ORAL at 08:54

## 2024-12-03 RX ADMIN — OXYCODONE HYDROCHLORIDE AND ACETAMINOPHEN 1 TABLET: 5; 325 TABLET ORAL at 11:37

## 2024-12-03 RX ADMIN — ASPIRIN 81 MG 81 MG: 81 TABLET ORAL at 08:54

## 2024-12-03 RX ADMIN — OXYCODONE HYDROCHLORIDE AND ACETAMINOPHEN 1 TABLET: 5; 325 TABLET ORAL at 19:01

## 2024-12-03 RX ADMIN — SODIUM CHLORIDE, PRESERVATIVE FREE 10 ML: 5 INJECTION INTRAVENOUS at 08:54

## 2024-12-03 ASSESSMENT — PAIN SCALES - GENERAL
PAINLEVEL_OUTOF10: 4
PAINLEVEL_OUTOF10: 8
PAINLEVEL_OUTOF10: 8
PAINLEVEL_OUTOF10: 0
PAINLEVEL_OUTOF10: 6

## 2024-12-03 ASSESSMENT — PAIN DESCRIPTION - LOCATION
LOCATION: HIP

## 2024-12-03 ASSESSMENT — PAIN DESCRIPTION - DESCRIPTORS: DESCRIPTORS: SORE;ACHING

## 2024-12-03 ASSESSMENT — PAIN DESCRIPTION - ORIENTATION
ORIENTATION: LEFT
ORIENTATION: LEFT

## 2024-12-03 ASSESSMENT — PAIN SCALES - WONG BAKER: WONGBAKER_NUMERICALRESPONSE: NO HURT

## 2024-12-03 NOTE — CARE COORDINATION
1138: CM met with patient at the bedside about DCP. CM obtained choice for [facility], referral sent.     0829: CM reviewed chart. DCP [facility] when medicallly ready. CM asked facility to start auth. CM will continue to follow.

## 2024-12-03 NOTE — PROGRESS NOTES
Orthopedic Progress Note    Date:12/3/2024       Room:[facility]  Patient Name:Grant Jacobs     YOB: 1940     Age:84 y.o.  male     SUBJECTIVE    2024: POD 1 s/p uncomplicated left hip hemiarthroplasty for left femoral neck fracture.  Patient is seen today for follow-up.       Last night patient reportedly developed acute left upper extremity weakness and left facial droop.  He was noted to have a flaccid left arm and mild left-sided facial droop on exam.  Head CT and CTA of the head and neck were obtained by the primary team, came back negative for acute stroke or large vessel occlusion.  He is awaiting an MRI of the brain today.    2024: POD #2 s/p uncomplicated left hip hemiarthroplasty for left femoral neck fracture.  Patient is resting comfortably in bed on time of rounds.  He reports pain to the hip only with movement.  He underwent a brain MRI today for a stroke rule out which was obtained by primary team.    12/3/2024: POD #3 s/p uncomplicated left hip hemiarthroplasty for left femoral neck fracture.  Patient is resting comfortably in bed on times rounds.  Nurses are present at bedside.  He reports his hip pain is improving.  He is still unable to plantar and dorsiflex foot secondary to stroke after surgery.  MRI ordered by primary team revealed right temporal ischemic stroke.  VITALS         Temp  Av.4 °F (36.9 °C)  Min: 97.5 °F (36.4 °C)  Max: 99.3 °F (37.4 °C)  Pulse  Av.1  Min: 76  Max: 107  Systolic (24hrs), Av , Min:134 , Max:162    Diastolic (24hrs), Av, Min:72, Max:93    Resp  Av.4  Min: 17  Max: 20  SpO2  Av.4 %  Min: 90 %  Max: 98 %  LABS      Recent Labs     24  0800 24  1227   WBC 11.0 10.1   RBC 4.75 4.25   HGB 14.5 12.9   HCT 44.3 38.4   MCV 93.3 90.4   RDW 13.5 13.5    198     Recent Labs     24  0800 24  1227    138   K 3.7 3.6   * 107   CO2 26 23   BUN 20 21*   GLUCOSE 124* 124*   PT/INR:  No results

## 2024-12-03 NOTE — PLAN OF CARE
Speech LAnguage Pathology EVALUATION    Patient: Grant Jacobs (84 y.o. male)  Date: 12/3/2024  Primary Diagnosis: Fracture [T14.8XXA]  Closed fracture of left hip, initial encounter ([facility]) [S72.002A]  Procedure(s) (LRB):  HIP HEMIARTHROPLASTY (Left) 3 Days Post-Op   Precautions: Aspiration, Fall Risk, Weight Bearing, Bed Alarm, General Precautions, Surgical Protocols Right Lower Extremity Weight Bearing: Weight Bearing As Tolerated                COMMUNICATION RECOMMENDATIONS: Present information slowly, simply, and with increased speaker volume. Allow extra time for processing information and sequencing/problem-solving within functional tasks. Provide reminders of important information.     ASSESSMENT :    Based on the objective data described below, the patient presents with     Moderate Cognitive-Linguistic Deficits  including deficits in  immediate and delayed recall,   poor initiation,   attention, and   significant deficits in executive function.     Mild deficits in receptive and expressive language also present.   Functional speech and voice abilities.   Recommend further assessment of visuospatial skills.     85 y/o male admitted w/R CVA and L-sided weakness, s/p GLF hip fx s/p surgery.   Reports having recently moved in w/son.   Denies needing assistance with cognitive or other tasks pta.   Current d/c disposition SNF rehab.     Dynamic assessment performed including administration of [employer] Mental Status (UMS) exam.     Simple yes/no questions and commands accurate. Simple personal open-ended questions 4/4 accuracy. Limited initiation. Expression exclusively 1-2 words or several word simple phrases. Perseverative asking where he would \"go to bed\" tonight. Did not appear to understand when SLP attempted to educate that patient is currently in his bed. Poor sequencing when attempting to put on his glasses w/glasses sideways and one of two end pieces folded in. No apparent recognition of  recall three facts from functional education related to CVA, role of therapy, aspiration precautions, etc. when given extra time within 7 day(s).        -Patient will perform simple attention tasks with min level assistance (e.g., naming x10 items during one-minute timed divergent naming, serial subtraction) given min cues within 7 day(s).      -Patient will perform simple functional or written sequencing tasks with 60% accuracy within 7 day(s).      -Patient will participate in additional assessment of visuospatial skills within 7day(s).    -Patient/caregiver goal: to be able to remember information better           Outcome: Progressing          PLAN :  Recommendations and Planned Interventions:  COMMUNICATION RECOMMENDATIONS: Present information slowly, simply, and with increased speaker volume. Allow extra time for processing information and sequencing/problem-solving within functional tasks. Provide reminders of important information.      Acute SLP Services: Yes, patient will be followed by speech-language pathology 4x/week to address goals. Patient's rehabilitation potential is considered to be Good.    Discharge Recommendations: High intensity and comprehensive skilled speech therapy in a multidisciplinary setting as patient is working towards tolerating up to 3 hours of therapy/day x 5-7 days/week     SUBJECTIVE:   Awake and alert. Upright in bed. Pleasant and cooperative. Confused at times.   OBJECTIVE:   No past medical history on file.  Past Surgical History:   Procedure Laterality Date    HIP SURGERY Left 11/30/2024    HIP HEMIARTHROPLASTY performed by Bimal Sin MD at [facility] MAIN OR     Prior Level of Function/Home Situation:   Social/Functional History  Lives With: Other (comment) (Brother and sister in law)  Type of Home: House  Home Layout: One level  Home Access: Stairs to enter with rails  Entrance Stairs - Number of Steps: 6  Entrance Stairs - Rails: Right  Bathroom Shower/Tub: Shower chair with

## 2024-12-03 NOTE — PLAN OF CARE
PHYSICAL THERAPY TREATMENT     Patient: Grant Jacobs (84 y.o. male)  Date: 12/3/2024  Diagnosis: Fracture [T14.8XXA]  Closed fracture of left hip, initial encounter ([facility]) [S72.002A] Fracture  Procedure(s) (LRB):  HIP HEMIARTHROPLASTY (Left) 3 Days Post-Op  Precautions: Fall Risk, Weight Bearing, Bed Alarm, General Precautions, Surgical Protocols Right Lower Extremity Weight Bearing: Weight Bearing As Tolerated                    Recommendations for nursing mobility: Encourage HEP in prep for ADLs/mobility; see handout for details, Frequent repositioning to prevent skin breakdown, Use of bed/chair alarm for safety, LE elevation for management of edema, and Assist x2    In place during session: External Catheter  Chart, physical therapy assessment, plan of care and goals were reviewed.  ASSESSMENT  Patient continues with skilled PT services and is progressing towards goals. Pt in bed upon PT arrival, agreeable to session. Pt A&O x 4. (See below for objective details and assist levels).     Overall pt tolerated session well today.  Patient completed bed mobility with mod-max A x2 today, improving with rolling in bed.  Pt cont to demonstrate impaired sitting balance at EOB with post, left side lean, able to improve slightly with cues however with fatigue continues to return to position. Patient then completed 2x sit<>stand transfers with max A x2 using HW. Flexed posture noted and only able to stand a few seconds each time. Educated on HW technique and correct hand placement. Pt completed in bed therex/ROM however no active movement noted on the LLE and req PROM. Educated on therex and having family/staff to assist as well on LLE multiple times during the day. Will educate further mobility and BEFAST next visit.  Will continue to benefit from skilled PT services, and will continue to progress as tolerated. Current PT DC recommendation High intensity and comprehensive skilled physical therapy in a  Oriented X4  Cognition  Overall Cognitive Status: Exceptions  Arousal/Alertness: Appears intact  Following Commands: Follows one step commands consistently;Follows multistep commands with increased time;Follows multistep commands with repitition  Attention Span: Appears intact  Memory: Appears intact  Safety Judgement: Decreased awareness of need for safety;Decreased awareness of need for assistance  Problem Solving: Decreased awareness of errors;Assistance required to correct errors made;Assistance required to identify errors made;Assistance required to implement solutions;Assistance required to generate solutions  Insights: Decreased awareness of deficits  Initiation: Requires cues for some  Sequencing: Requires cues for some    Functional Mobility Training:  Bed Mobility:  Bed Mobility Training  Bed Mobility Training: Yes  Interventions: Demonstration;Manual cues;Safety awareness training;Tactile cues;Verbal cues  Rolling: Moderate assistance;Assist X2;Additional time  Supine to Sit: Maximum assistance;Assist X2;Additional time  Sit to Supine: Maximum assistance;Assist X2;Additional time  Scooting: Maximum assistance;Assist X2;Additional time  Transfers:  Transfer Training  Transfer Training: Yes  Interventions: Verbal cues;Safety awareness training;Manual cues;Tactile cues;Visual cues;Weight shifting training/pressure relief  Sit to Stand: Maximum assistance;Assist X2;Additional time  Stand to Sit: Assist X2;Additional time;Maximum assistance    Balance:  Balance  Sitting: Impaired;With support  Sitting - Static: Fair (occasional);Supported sitting  Sitting - Dynamic: Poor (constant support);Supported sitting  Standing: Impaired;With support  Standing - Static: Constant support;Poor  Standing - Dynamic: Not tested    Therapeutic Exercises:     EXERCISE   Sets   Reps   Active Active Assist   Passive Self ROM   Comments   Ankle Pumps  10 [x] [] [x] []    Quad Sets/Glut Sets   [] [] [] []    Hamstring Sets   [] []

## 2024-12-03 NOTE — PLAN OF CARE
system is unable to meet their requirements for physical assistance, pt has impaired cognition, pt is a high fall risk, pt is not safe to be alone, and concern for pt safely navigating or managing the home environment.     IF patient discharges home will need the following DME: continuing to assess with progress     SUBJECTIVE:   Patient stated “Thank you so much for working with me today.”      OBJECTIVE DATA SUMMARY:   Cognitive/Behavioral Status:  Orientation  Overall Orientation Status: Impaired  Orientation Level: Oriented X4  Cognition  Overall Cognitive Status: Exceptions  Arousal/Alertness: Appears intact  Following Commands: Follows one step commands consistently;Follows multistep commands with increased time;Follows multistep commands with repitition  Attention Span: Appears intact  Memory: Appears intact  Safety Judgement: Decreased awareness of need for safety;Decreased awareness of need for assistance  Problem Solving: Decreased awareness of errors;Assistance required to correct errors made;Assistance required to identify errors made;Assistance required to implement solutions;Assistance required to generate solutions  Insights: Decreased awareness of deficits  Initiation: Requires cues for some  Sequencing: Requires cues for some    Functional Mobility and Transfers for ADLs:  Bed Mobility:  Bed Mobility Training  Bed Mobility Training: Yes  Interventions: Demonstration;Manual cues;Safety awareness training;Tactile cues;Verbal cues  Rolling: Moderate assistance;Assist X2;Additional time  Supine to Sit: Maximum assistance;Assist X2;Additional time  Sit to Supine: Maximum assistance;Assist X2;Additional time  Scooting: Maximum assistance;Assist X2;Additional time    Transfers:  Transfer Training  Transfer Training: Yes  Interventions: Verbal cues;Safety awareness training;Manual cues;Tactile cues;Visual cues;Weight shifting training/pressure relief  Sit to Stand: Maximum assistance;Assist X2;Additional  time  Stand to Sit: Assist X2;Additional time;Maximum assistance      Balance:  Balance  Sitting: Impaired;With support  Sitting - Static: Fair (occasional);Supported sitting  Sitting - Dynamic: Poor (constant support);Supported sitting  Standing: Impaired;With support  Standing - Static: Constant support;Poor  Standing - Dynamic: Not tested      Therapeutic exercise:  Pt completed semi supine in bed, w/ min verbal/visual cues for proper technique and self pacing for R UE and TA w/ prom of L UE and mod A w/ self rom exercise..     Exercise Sets Reps AROM R UE AAROM PROM Self PROM Comments   Shoulder flex/ext 1 10 [x] [] [x] [x]    Elbow flex/ext 1 10 [x] [] [x] [x]    Wrist flex/ext 1 10 [x] [] [x] [x]    Hand flex/ ext 1 10 [x] [] [x] []      Pain Ratin/10 inially L thigh  0/10 end of session  Pain Intervention(s):   patient medicated for pain prior to session    Activity Tolerance:   Fair  and requires rest breaks    After treatment patient left in no apparent distress:   Bed locked and returned to lowest position, Patient left in no apparent distress in bed, Call bell within reach, Bed/ chair alarm activated, and Side rails x3, and nsg updated     COMMUNICATION/EDUCATION:   The patient’s plan of care was discussed with: Physical therapy assistant and Registered nurse  PT/OT sessions occurred partially together for increased safety of pt and clinician.    Patient Education  Education Given To: Patient  Education Provided: Role of Therapy;ADL Adaptive Strategies;Fall Prevention Strategies;Plan of Care;Precautions;Transfer Training;Mobility Training;Home Exercise Program;Energy Conservation  Education Method: Verbal  Barriers to Learning: Cognition;Vision  Education Outcome: Continued education needed    Thank you for this referral.  DAGO Minor  Minutes: 41

## 2024-12-03 NOTE — PROGRESS NOTES
Hospitalist Progress Note               Daily Progress Note: 12/3/2024      Hospital Day: 5     Chief complaint:   Chief Complaint   Patient presents with    Fall        Subjective:   Hospital course to date:    Mr. Jacobs is an 84-year-old male with history of hypertension who presented to the emergency department 11/29 after mechanical ground-level fall.  Patient states that he fell in the kitchen. No syncopal episode. Did not have episodes of chest pain/palpitations afterwards. Denies head trauma. He has had chronic left knee pain and gradual difficulty ambulating. Otherwise, he states that he is fairly healthy and is not taking any daily medicines as he self discontinued his antihypertensives.      In the ED trauma bravo workup obtained.  Vital signs notable for uncontrolled hypertension.  Labs without leukocytosis, anemia.  CMP notable for creatinine of 1.32, elevated total bilirubin 1.6.  CT of the head negative for acute infarct.  CT C-spine negative for fracture.  Left hip x-ray showed acute left femoral neck fracture, confirmed by CT left hip.  Orthopedics consulted and plan for surgical intervention.      Patient subsequently admitted to hospitalist service.  Ultrasound of the abdomen obtained given hyperbilirubinemia, cholecystolithiasis showed no evidence of acute cholecystitis.      Patient taken to the OR 11/30 for left hip arthroplasty.    On the evening of 11/30 patient developed acute left upper extremity weakness and left facial droop.  He was noted to have a flaccid left arm and mild left-sided facial droop on exam.  Head CT and CTA of the head and neck were obtained, came back negative for acute stroke or large vessel occlusion.  MRI revealed right temporal ischemic stroke.  --------  Patient is seen today for follow-up.  No acute changes. Left hemiparesis no better or worse. No concerns with L hip. States that he has not had a bowel movement in about 3 days and is starting to have some  of medications for the past year.  Recently moved here and does not have a PCP  - BP currently stable/borderline   - Started on Amlodipine 5 mg daily    Hyperlipidemia  - Continue high intensity statin    Elevated total bilirubin, largely indirect  - No transaminitis  - Denies any abdominal pain, nausea/vomiting, or fevers at home  - Abdominal exam benign  - Abdominal ultrasound with cholecystolithiasis no evidence of acute cholecystitis  - Suspect [place] disease    Acute kidney injury  - Baseline creatinine unknown  - Resolved with IV fluids  - Avoid NSAIDs for pain management     Scalp lesion  - Did not have head trauma.  No TTP.  Does not know how long this lesion has been there  - CT head negative  - Could potentially be basal cell carcinoma.  Recommend dermatology follow-up or  PCP follow-up      Plan:  Continue aspirin, Plavix  Continue high intensity statin   Patient will need inpatient rehab  Continue PT/OT        Code status: full    Social determinants of health: none    Estimated discharge date//time frame/disposition: 24 hours    Barriers to discharge:           Josy Hull

## 2024-12-03 NOTE — PROGRESS NOTES
Hospitalist Progress Note               Daily Progress Note: 12/3/2024      Hospital Day: 5     Chief complaint:   Chief Complaint   Patient presents with    Fall        Subjective:   Hospital course to date:    Mr. Jacobs is an 84-year-old male with history of hypertension who presented to the emergency department 11/29 after mechanical ground-level fall.  Patient states that he fell in the kitchen. No syncopal episode. Did not have episodes of chest pain/palpitations afterwards. Denies head trauma. He has had chronic left knee pain and gradual difficulty ambulating. Otherwise, he states that he is fairly healthy and is not taking any daily medicines as he self discontinued his antihypertensives.      In the ED trauma bravo workup obtained.  Vital signs notable for uncontrolled hypertension.  Labs without leukocytosis, anemia.  CMP notable for creatinine of 1.32, elevated total bilirubin 1.6.  CT of the head negative for acute infarct.  CT C-spine negative for fracture.  Left hip x-ray showed acute left femoral neck fracture, confirmed by CT left hip.  Orthopedics consulted and plan for surgical intervention.      Patient subsequently admitted to hospitalist service.  Ultrasound of the abdomen obtained given hyperbilirubinemia, cholecystolithiasis showed no evidence of acute cholecystitis.      Patient taken to the OR 11/30 for left hip arthroplasty.    On the evening of 11/30 patient developed acute left upper extremity weakness and left facial droop.  He was noted to have a flaccid left arm and mild left-sided facial droop on exam.  Head CT and CTA of the head and neck were obtained, came back negative for acute stroke or large vessel occlusion.  MRI revealed right temporal ischemic stroke.  --------  Patient is seen today for follow-up.  No acute changes. Left hemiparesis no better or worse. No concerns with L hip. States that he has not had a bowel movement in about 3 days and is starting to have some  Review (any 3)  [] All available Consultant notes from yesterday/today were reviewed  [] All current labs were reviewed and interpreted for clinical significance   [] Appropriate follow-up labs were ordered  [] Collateral history obtained from:               Assessment:  Acute right temporal ischemic stroke with left hemiparesis  - CT head negative, CTA head and neck negative for LVO  - MRI revealed right temporal ischemic stroke  - ECHO 12/2 revealed EF of 55-60%. Inconclusive study d/t poor image quality  - Continue ASA, Plavix and high intensity statin  - Neurology following     Left hip fracture status post mechanical ground-level fall  - XR L hip revealed acute left hip fracture  - CT revealed acute subcapital left femoral neck fracture with varus and anterior angulation  - Underwent  left hip arthroplasty on 11/30  - Pain controlled with Tylenol and Dilaudid.       Hypertension   - States that he did have a history of high blood pressure but took himself off of medications for the past year.  Recently moved here and does not have a PCP  - BP currently stable/borderline   - Started on Amlodipine 5 mg daily    Hyperlipidemia  - Continue high intensity statin    Elevated total bilirubin, largely indirect  - No transaminitis  - Denies any abdominal pain, nausea/vomiting, or fevers at home  - Abdominal exam benign  - Abdominal ultrasound with cholecystolithiasis no evidence of acute cholecystitis  - Suspect [place] disease    Acute kidney injury  - Baseline creatinine unknown  - Resolved with IV fluids  - Avoid NSAIDs for pain management     Scalp lesion  - Did not have head trauma.  No TTP.  Does not know how long this lesion has been there  - CT head negative  - Could potentially be basal cell carcinoma.  Recommend dermatology follow-up or  PCP follow-up      Plan:  Continue aspirin, Plavix  Continue high intensity statin   Patient will need inpatient rehab  Continue PT/OT        Code status: full    Social

## 2024-12-03 NOTE — PLAN OF CARE
Problem: Discharge Planning  Goal: Discharge to home or other facility with appropriate resources  12/3/2024 0912 by Kathleen Lopez RN  Outcome: Progressing  Flowsheets (Taken 12/3/2024 0912)  Discharge to home or other facility with appropriate resources:   Identify barriers to discharge with patient and caregiver   Arrange for needed discharge resources and transportation as appropriate  12/2/2024 2200 by Elida Malik RN  Outcome: Progressing  Flowsheets (Taken 12/2/2024 2030)  Discharge to home or other facility with appropriate resources: Identify barriers to discharge with patient and caregiver     Problem: Pain  Goal: Verbalizes/displays adequate comfort level or baseline comfort level  12/3/2024 0912 by Kathleen Lopez RN  Outcome: Progressing  Flowsheets (Taken 12/3/2024 0912)  Verbalizes/displays adequate comfort level or baseline comfort level:   Encourage patient to monitor pain and request assistance   Assess pain using appropriate pain scale  12/2/2024 2200 by Elida Malik RN  Outcome: Progressing  Flowsheets (Taken 12/2/2024 0530 by Lori Rizzo RN)  Verbalizes/displays adequate comfort level or baseline comfort level: Encourage patient to monitor pain and request assistance     Problem: Skin/Tissue Integrity  Goal: Absence of new skin breakdown  Description: 1.  Monitor for areas of redness and/or skin breakdown  2.  Assess vascular access sites hourly  3.  Every 4-6 hours minimum:  Change oxygen saturation probe site  4.  Every 4-6 hours:  If on nasal continuous positive airway pressure, respiratory therapy assess nares and determine need for appliance change or resting period.  12/3/2024 0912 by Kathleen Lopez, RN  Outcome: Progressing  12/2/2024 2200 by Elida Malik RN  Outcome: Progressing     Problem: Safety - Adult  Goal: Free from fall injury  12/3/2024 0912 by Kathleen Lopez RN  Outcome: Progressing  Flowsheets (Taken 12/3/2024 0912)  Free From Fall

## 2024-12-03 NOTE — PLAN OF CARE
Problem: Discharge Planning  Goal: Discharge to home or other facility with appropriate resources  12/2/2024 2200 by Elida Malik RN  Outcome: Progressing  Flowsheets (Taken 12/2/2024 2030)  Discharge to home or other facility with appropriate resources: Identify barriers to discharge with patient and caregiver  12/2/2024 1230 by Edna Bowen RN  Outcome: Progressing     Problem: Pain  Goal: Verbalizes/displays adequate comfort level or baseline comfort level  12/2/2024 2200 by Elida Malik RN  Outcome: Progressing  Flowsheets (Taken 12/2/2024 0530 by Lori Rizzo, RN)  Verbalizes/displays adequate comfort level or baseline comfort level: Encourage patient to monitor pain and request assistance  12/2/2024 1230 by Edna Bowen RN  Outcome: Progressing  Flowsheets (Taken 12/2/2024 0530 by Lori Rizzo, RN)  Verbalizes/displays adequate comfort level or baseline comfort level: Encourage patient to monitor pain and request assistance     Problem: Skin/Tissue Integrity  Goal: Absence of new skin breakdown  Description: 1.  Monitor for areas of redness and/or skin breakdown  2.  Assess vascular access sites hourly  3.  Every 4-6 hours minimum:  Change oxygen saturation probe site  4.  Every 4-6 hours:  If on nasal continuous positive airway pressure, respiratory therapy assess nares and determine need for appliance change or resting period.  12/2/2024 2200 by Elida Malik RN  Outcome: Progressing  12/2/2024 1230 by Edna Bowen RN  Outcome: Progressing     Problem: Safety - Adult  Goal: Free from fall injury  12/2/2024 2200 by Elida Malik RN  Outcome: Progressing  Flowsheets (Taken 12/2/2024 2159)  Free From Fall Injury: Instruct family/caregiver on patient safety  12/2/2024 1230 by Edna Bowen RN  Outcome: Progressing     Problem: Physical Therapy - Adult  Goal: By Discharge: Performs mobility at highest level of function for planned discharge setting.  See evaluation for  individualized goals.  Description: FUNCTIONAL STATUS PRIOR TO ADMISSION: Unknown prior  functional mobility,appears indep .    HOME SUPPORT PRIOR TO ADMISSION: The patient lived with wife and indep for ADLs.    Physical Therapy Goals  Initiated 12/1/2024  Pt stated goal: Get better  Pt will be I with LE HEP in 7 days.  Pt will perform bed mobility with Maximal Assist and Assist x1 in 7 days.  Pt will perform transfers with Maximal Assist and Assist x2 in 7 days.   Pt will verbalize and demonstrate compliance with fall/hip/stroke precautions  in 7 days.   Pt will demonstrate improvement in sitting balance from poor to fair in 7 days.    12/2/2024 1607 by Rebecca Rivera, SPT  Outcome: Progressing     Problem: Occupational Therapy - Adult  Goal: By Discharge: Performs self-care activities at highest level of function for planned discharge setting.  See evaluation for individualized goals.  Description: FUNCTIONAL STATUS PRIOR TO ADMISSION:  Pt was independent and active without use of AD for functional mobility and ADLs.     HOME SUPPORT: The patient lived with his brother and sister-in-law.    Occupational Therapy Goals:  Initiated 12/2/2024  Patient/Family stated goal: Be independent again  1.  Patient will perform lower body dressing with [place] within 7 day(s).  2.  Patient will perform upper body dressing with [place] within 7 day(s).  3.  Patient will perform grooming with [place] within 7 day(s).  4.  Patient will perform toilet transfers with [place]  within 7 day(s).  5.  Patient will perform all aspects of toileting with [place] within 7 day(s).  6.  Patient will participate in upper extremity therapeutic exercise/activities with [place] within 7 day(s).      12/2/2024 1512 by Jacinta Moya, OT  Outcome: Progressing     Problem: ABCDS Injury Assessment  Goal: Absence of physical injury  Outcome: Progressing  Flowsheets (Taken 12/2/2024 2200)  Absence of Physical

## 2024-12-04 PROCEDURE — 2580000003 HC RX 258: Performed by: STUDENT IN AN ORGANIZED HEALTH CARE EDUCATION/TRAINING PROGRAM

## 2024-12-04 PROCEDURE — 6370000000 HC RX 637 (ALT 250 FOR IP): Performed by: STUDENT IN AN ORGANIZED HEALTH CARE EDUCATION/TRAINING PROGRAM

## 2024-12-04 PROCEDURE — 6370000000 HC RX 637 (ALT 250 FOR IP): Performed by: INTERNAL MEDICINE

## 2024-12-04 PROCEDURE — 6370000000 HC RX 637 (ALT 250 FOR IP): Performed by: PSYCHIATRY & NEUROLOGY

## 2024-12-04 PROCEDURE — 92522 EVALUATE SPEECH PRODUCTION: CPT

## 2024-12-04 PROCEDURE — 97530 THERAPEUTIC ACTIVITIES: CPT

## 2024-12-04 PROCEDURE — 99024 POSTOP FOLLOW-UP VISIT: CPT

## 2024-12-04 PROCEDURE — 1100000000 HC RM PRIVATE

## 2024-12-04 PROCEDURE — 6370000000 HC RX 637 (ALT 250 FOR IP)

## 2024-12-04 RX ORDER — LACTULOSE 10 G/15ML
20 SOLUTION ORAL ONCE
Status: COMPLETED | OUTPATIENT
Start: 2024-12-04 | End: 2024-12-04

## 2024-12-04 RX ADMIN — POLYETHYLENE GLYCOL 3350 17 G: 17 POWDER, FOR SOLUTION ORAL at 13:57

## 2024-12-04 RX ADMIN — SODIUM CHLORIDE, PRESERVATIVE FREE 10 ML: 5 INJECTION INTRAVENOUS at 20:22

## 2024-12-04 RX ADMIN — SODIUM CHLORIDE, PRESERVATIVE FREE 10 ML: 5 INJECTION INTRAVENOUS at 10:01

## 2024-12-04 RX ADMIN — OXYCODONE HYDROCHLORIDE AND ACETAMINOPHEN 1 TABLET: 5; 325 TABLET ORAL at 20:21

## 2024-12-04 RX ADMIN — LACTULOSE 20 G: 20 SOLUTION ORAL at 17:49

## 2024-12-04 RX ADMIN — ASPIRIN 81 MG 81 MG: 81 TABLET ORAL at 09:59

## 2024-12-04 RX ADMIN — Medication 10 MG: at 20:21

## 2024-12-04 RX ADMIN — CLOPIDOGREL BISULFATE 75 MG: 75 TABLET ORAL at 09:58

## 2024-12-04 RX ADMIN — OXYCODONE HYDROCHLORIDE AND ACETAMINOPHEN 1 TABLET: 5; 325 TABLET ORAL at 06:41

## 2024-12-04 RX ADMIN — ATORVASTATIN CALCIUM 80 MG: 40 TABLET, FILM COATED ORAL at 20:21

## 2024-12-04 RX ADMIN — AMLODIPINE BESYLATE 5 MG: 5 TABLET ORAL at 09:58

## 2024-12-04 RX ADMIN — OXYCODONE HYDROCHLORIDE AND ACETAMINOPHEN 1 TABLET: 5; 325 TABLET ORAL at 13:57

## 2024-12-04 RX ADMIN — PANTOPRAZOLE SODIUM 40 MG: 40 TABLET, DELAYED RELEASE ORAL at 05:36

## 2024-12-04 ASSESSMENT — PAIN DESCRIPTION - LOCATION
LOCATION: HIP

## 2024-12-04 ASSESSMENT — PAIN - FUNCTIONAL ASSESSMENT
PAIN_FUNCTIONAL_ASSESSMENT: PREVENTS OR INTERFERES SOME ACTIVE ACTIVITIES AND ADLS
PAIN_FUNCTIONAL_ASSESSMENT: PREVENTS OR INTERFERES SOME ACTIVE ACTIVITIES AND ADLS

## 2024-12-04 ASSESSMENT — PAIN SCALES - GENERAL
PAINLEVEL_OUTOF10: 7
PAINLEVEL_OUTOF10: 9
PAINLEVEL_OUTOF10: 6
PAINLEVEL_OUTOF10: 0
PAINLEVEL_OUTOF10: 0

## 2024-12-04 ASSESSMENT — PAIN DESCRIPTION - DESCRIPTORS
DESCRIPTORS: ACHING;DISCOMFORT
DESCRIPTORS: ACHING;DISCOMFORT

## 2024-12-04 ASSESSMENT — PAIN DESCRIPTION - ORIENTATION
ORIENTATION: LEFT
ORIENTATION: LEFT

## 2024-12-04 ASSESSMENT — PAIN SCALES - WONG BAKER: WONGBAKER_NUMERICALRESPONSE: NO HURT

## 2024-12-04 NOTE — DISCHARGE SUMMARY
Physician Discharge Summary     Patient ID:    Grant Jacobs  [id number]  84 y.o.  1940    Admit date: 11/29/2024    Discharge date : 12/4/2024      Final Diagnoses:   Left hip fracture s/p left hip arthroplasty   Acute right temporal ischemic stroke  Hypertension  Hyperlipidemia  Elevated total bilirubin  Acute kidney injury  Scalp lesion      Reason for Hospitalization:  Mr. Jacobs is an 84-year-old male with history of hypertension who presented to the emergency department 11/29 after mechanical ground-level fall.  Patient states that he fell in the kitchen. No syncopal episode. Did not have episodes of chest pain/palpitations afterwards. Denies head trauma. He has had chronic left knee pain and gradual difficulty ambulating. Otherwise, he states that he is fairly healthy and is not taking any daily medicines as he self discontinued his antihypertensives.      In the ED trauma bravo workup obtained.  Vital signs notable for uncontrolled hypertension.  Labs without leukocytosis, anemia.  CMP notable for creatinine of 1.32, elevated total bilirubin 1.6.  CT of the head negative for acute infarct. CT C-spine negative for fracture.  Left hip x-ray showed acute left femoral neck fracture, confirmed by CT left hip.  Orthopedics consulted and plan for surgical intervention.        Hospital Course:   Patient subsequently admitted to hospitalist service.  Ultrasound of the abdomen obtained given hyperbilirubinemia, cholecystolithiasis showed no evidence of acute cholecystitis.       Patient taken to the OR 11/30 for left hip arthroplasty.     On the evening of 11/30 patient developed acute left upper extremity weakness and left facial droop.  He was noted to have a flaccid left arm and mild left-sided facial droop on exam.  Head CT and CTA of the head and neck were obtained, came back negative for acute stroke or large vessel occlusion.  MRI revealed right temporal ischemic stroke.     ECHO 12/2   Clear to auscultation bilaterally.   Chest wall:  No tenderness or deformity.   Heart:  Regular rate and rhythm, S1, S2 normal, no murmur, click, rub or gallop.   Abdomen:   Soft, non-tender. Bowel sounds normal. No masses,  No organomegaly.   Extremities: Extremities normal, atraumatic, no cyanosis or edema.   Pulses: 2+ and symmetric all extremities.   Skin: Skin color, texture, turgor normal. No rashes or lesions   Neurologic: CNII-XII intact. No gross sensory or motor deficits             Significant Diagnostic Studies:   11/29/2024: BUN 22 mg/dL (H; Ref range: 6 - 20 mg/dL); Calcium 9.1 mg/dL (Ref range: 8.5 - 10.1 mg/dL); Chloride 110 mmol/L (H; Ref range: 97 - 108 mmol/L); CO2 28 mmol/L (Ref range: 21 - 32 mmol/L); Creatinine 1.32 mg/dL (H; Ref range: 0.70 - 1.30 mg/dL); Glucose 146 mg/dL (H; Ref range: 65 - 100 mg/dL); Hematocrit 46.7 % (Ref range: 36.6 - 50.3 %); Hemoglobin 15.8 g/dL (Ref range: 12.1 - 17.0 g/dL); Potassium 3.9 mmol/L (Ref range: 3.5 - 5.1 mmol/L); Sodium 143 mmol/L (Ref range: 136 - 145 mmol/L)  11/30/2024: BUN 16 mg/dL (Ref range: 6 - 20 mg/dL); Calcium 8.6 mg/dL (Ref range: 8.5 - 10.1 mg/dL); Chloride 111 mmol/L (H; Ref range: 97 - 108 mmol/L); CO2 25 mmol/L (Ref range: 21 - 32 mmol/L); Creatinine 1.00 mg/dL (Ref range: 0.70 - 1.30 mg/dL); Glucose 132 mg/dL (H; Ref range: 65 - 100 mg/dL); Hematocrit 42.9 % (Ref range: 36.6 - 50.3 %); Hemoglobin 14.6 g/dL (Ref range: 12.1 - 17.0 g/dL); Potassium 3.7 mmol/L (Ref range: 3.5 - 5.1 mmol/L); Sodium 141 mmol/L (Ref range: 136 - 145 mmol/L)  Recent Labs     12/02/24  1227 12/03/24  0746   WBC 10.1 9.4   HGB 12.9 12.9   HCT 38.4 38.9    213     Recent Labs     12/02/24  1227 12/03/24  0746    138   K 3.6 3.5    105   CO2 23 26   BUN 21* 22*   CREATININE 1.09 1.12   GLUCOSE 124* 106*   CALCIUM 8.4* 8.7     Recent Labs     12/02/24  1227 12/03/24  0746   AST 28 29   ALT 17 20   ALKPHOS 81 83   BILITOT 2.3* 1.7*   GLOB 4.2* 4.7*

## 2024-12-04 NOTE — PLAN OF CARE
OCCUPATIONAL THERAPY TREATMENT  Patient: Grant Jacobs (84 y.o. male)  Date: 12/4/2024  Primary Diagnosis: Fracture [T14.8XXA]  Closed fracture of left hip, initial encounter ([facility]) [S72.002A]  Procedure(s) (LRB):  HIP HEMIARTHROPLASTY (Left) 4 Days Post-Op   Precautions: Fall Risk, Weight Bearing, Bed Alarm, General Precautions, Surgical Protocols Right Lower Extremity Weight Bearing: Weight Bearing As Tolerated              Recommendations for nursing mobility: Encourage HEP in prep for ADLs/mobility; see handout for details, Frequent repositioning to prevent skin breakdown, Use of BSC for toileting , AD and gt belt for bed to chair , Hima Stedy for bed to chair transfers , and Assist x2    In place during session: External Catheter and EKG/telemetry   Chart, occupational therapy assessment, plan of care, and goals were reviewed.  ASSESSMENT  Patient continues with skilled OT services and is progressing towards goals. Pt semi supine in bed upon OROZCO arrival, agreeable to session. Pt A&O x 4. Pt required TA for donning and doffing sling on L UE. Pt required max A x 2 for bed mobility and tolerated eob for ~ 10 minutes.  Pt demonstrated slightly better sitting balance from previous session. 1 sts completed w/ max A x 2 and leana walker. Pt required mod vc's for proper hand positioning and for proper body alignment.  Pt returned to eob and hima steady placed and pt completed sts w/ max A x 2. Pt t/f'ed to recliner.  Pt completed face washing w/ set up.Pt left sated in recliner with all known needs met. (See below for objective details and assist levels).     Overall pt tolerated session fair today with rest breaks. Current OT recommendations for discharge High intensity and comprehensive skilled occupational therapy in a multidisciplinary setting as patient is working towards tolerating up to 3 hours of therapy/day x 5-7 days/week. Will continue to benefit from skilled OT services, and will continue to progress as  support  Standing - Static: Constant support;Poor  Standing - Dynamic: Not tested      ADL Intervention:    Grooming: Setup;Supervision   Grooming Skilled Clinical Factors: to wash face    Therapeutic exercise:  Pt completed semi supine in bed w/ L UE only, w/ mod verbal/visual cues for proper technique and self pacing. Pt required [place] assist w/ LUE.    Exercise Sets Reps AROM AAROM PROM Self PROM Comments   Shoulder flex/ext 1 12 [x] [] [] []    Elbow flex/ext 1 12 [x] [] [] []    Wrist flex/ext 1 12 [x] [] [] []    Hand flex/ ext 1 12 [x] [] [] []      Pain Ratin/10 at rest  Pain Intervention(s):   pain is at a level acceptable to the patient    Activity Tolerance:   Fair  and requires rest breaks    After treatment patient left in no apparent distress:   Bed locked and returned to lowest position, Patient left in no apparent distress sitting up in chair and Call bell within reach, and nsg updated     COMMUNICATION/EDUCATION:   The patient’s plan of care was discussed with: Physical therapy assistant and Registered nurse  PT/OT sessions occurred together for increased safety of pt and clinician.    Patient Education  Education Given To: Patient  Education Provided: Role of Therapy;ADL Adaptive Strategies;Fall Prevention Strategies;Plan of Care;Precautions;Transfer Training;Mobility Training;Home Exercise Program;Energy Conservation  Education Method: Verbal  Barriers to Learning: Cognition;Vision  Education Outcome: Continued education needed    Thank you for this referral.  DAGO Minor  Minutes: 40

## 2024-12-04 NOTE — PLAN OF CARE
Problem: Discharge Planning  Goal: Discharge to home or other facility with appropriate resources  12/3/2024 2125 by Elida Malik RN  Outcome: Progressing  Flowsheets (Taken 12/3/2024 2015)  Discharge to home or other facility with appropriate resources: Identify barriers to discharge with patient and caregiver  12/3/2024 0912 by Kathleen Lopez RN  Outcome: Progressing  Flowsheets (Taken 12/3/2024 0912)  Discharge to home or other facility with appropriate resources:   Identify barriers to discharge with patient and caregiver   Arrange for needed discharge resources and transportation as appropriate     Problem: Pain  Goal: Verbalizes/displays adequate comfort level or baseline comfort level  12/3/2024 2125 by Elida Malik RN  Outcome: Progressing  Flowsheets (Taken 12/3/2024 0912 by Kathleen Lopze RN)  Verbalizes/displays adequate comfort level or baseline comfort level:   Encourage patient to monitor pain and request assistance   Assess pain using appropriate pain scale  12/3/2024 0912 by Kathleen Lopez RN  Outcome: Progressing  Flowsheets (Taken 12/3/2024 0912)  Verbalizes/displays adequate comfort level or baseline comfort level:   Encourage patient to monitor pain and request assistance   Assess pain using appropriate pain scale     Problem: Skin/Tissue Integrity  Goal: Absence of new skin breakdown  Description: 1.  Monitor for areas of redness and/or skin breakdown  2.  Assess vascular access sites hourly  3.  Every 4-6 hours minimum:  Change oxygen saturation probe site  4.  Every 4-6 hours:  If on nasal continuous positive airway pressure, respiratory therapy assess nares and determine need for appliance change or resting period.  12/3/2024 2125 by Elida Malik RN  Outcome: Progressing  12/3/2024 0912 by Kathleen Lopez RN  Outcome: Progressing     Problem: Safety - Adult  Goal: Free from fall injury  12/3/2024 2125 by Elida Malik RN  Outcome: Progressing  Flowsheets (Taken    Problem: ABCDS Injury Assessment  Goal: Absence of physical injury  12/3/2024 2125 by Elida Malik, RN  Outcome: Progressing  Flowsheets (Taken 12/3/2024 2123)  Absence of Physical Injury: Implement safety measures based on patient assessment  12/3/2024 0912 by Kathleen Lopez, RN  Outcome: Progressing  Flowsheets (Taken 12/3/2024 0912)  Absence of Physical Injury: Implement safety measures based on patient assessment

## 2024-12-04 NOTE — PROGRESS NOTES
Hospitalist Progress Note               Daily Progress Note: 12/4/2024      Hospital Day: 6     Chief complaint:   Chief Complaint   Patient presents with    Fall        Subjective:   Hospital course to date:    Mr. Jacobs is an 84-year-old male with history of hypertension who presented to the emergency department 11/29 after mechanical ground-level fall.  Patient states that he fell in the kitchen. No syncopal episode. Did not have episodes of chest pain/palpitations afterwards. Denies head trauma. He has had chronic left knee pain and gradual difficulty ambulating. Otherwise, he states that he is fairly healthy and is not taking any daily medicines as he self discontinued his antihypertensives.      In the ED trauma bravo workup obtained.  Vital signs notable for uncontrolled hypertension.  Labs without leukocytosis, anemia.  CMP notable for creatinine of 1.32, elevated total bilirubin 1.6.  CT of the head negative for acute infarct.  CT C-spine negative for fracture.  Left hip x-ray showed acute left femoral neck fracture, confirmed by CT left hip.  Orthopedics consulted and plan for surgical intervention.      Patient subsequently admitted to hospitalist service.  Ultrasound of the abdomen obtained given hyperbilirubinemia, cholecystolithiasis showed no evidence of acute cholecystitis.      Patient taken to the OR 11/30 for left hip arthroplasty.    On the evening of 11/30 patient developed acute left upper extremity weakness and left facial droop.  He was noted to have a flaccid left arm and mild left-sided facial droop on exam.  Head CT and CTA of the head and neck were obtained, came back negative for acute stroke or large vessel occlusion.  MRI revealed right temporal ischemic stroke.    ECHO 12/2 revealed EF of 55-60%. Inconclusive study d/t poor image quality  --------  Patient is seen today for follow-up.  No acute changes. Left hemiparesis no better or worse. No concerns with L hip; pain adequately

## 2024-12-04 NOTE — PLAN OF CARE
Speech LAnguage Pathology EVALUATION    Patient: Grant Jacobs (84 y.o. male)  Date: 12/4/2024  Primary Diagnosis: Fracture [T14.8XXA]  Closed fracture of left hip, initial encounter ([facility]) [S72.002A]  Procedure(s) (LRB):  HIP HEMIARTHROPLASTY (Left) 4 Days Post-Op   Precautions: aspiration Fall Risk, Weight Bearing, Bed Alarm, General Precautions, Surgical Protocols Right Lower Extremity Weight Bearing: Weight Bearing As Tolerated                COMMUNICATION RECOMMENDATIONS: use of speech strategies: over-articulate, slow, loud    ASSESSMENT :  Based on the objective data described below, the patient presents with mild dysarthria c/b imprecise articulation, blended word boundaries and increased speech rate.  Patient benefits from use of compensatory speech strategies of over-articulation, speaking slow and adequate use of breath support for increased vocal amplitude.     Patient reports changes in his speech following CVA. Left facial droop present.   Educated patient to use of speech strategies and discussed application of each.   During evaluation, patient attempted to get out of bed twice. Patient was educated to fall precautions and safety precautions.     Patient will benefit from skilled intervention to address the above impairments.    GOALS:    Problem: SLP Adult - Impaired Communication  Goal: By Discharge: Demonstrates communication skills at highest level of function for planned discharge setting.  See evaluation for individualized goals.  Description: Speech Therapy Swallow Goals  Initiated 12/3/2024  -recall speech intelligibility strategies 4/4    -produce sentences w/ use of speech strategies MIN A     -Patient will immediately recall three facts from functional education related to CVA, role of therapy,   aspiration precautions, etc. when given extra time within 7 day(s).        -Patient will perform simple attention tasks with min level assistance (e.g., naming x10 items during one-minute timed  divergent naming, serial subtraction) given min cues within 7 day(s).      -Patient will perform simple functional or written sequencing tasks with 60% accuracy within 7 day(s).      -Patient will participate in additional assessment of visuospatial skills within 7day(s).    -Patient/caregiver goal: to be able to remember information better           Outcome: Progressing          PLAN :  Recommendations and Planned Interventions:  Diet: Minced and moist and thin liquids  Continue w/ dysphagia tx, speech and cognitive tx.      Acute SLP Services: Yes, patient will be followed by speech-language pathology 5x/week to address goals. Patient's rehabilitation potential is considered to be Good.    Discharge Recommendations: High intensity and comprehensive skilled speech therapy in a multidisciplinary setting as patient is working towards tolerating up to 3 hours of therapy/day x 5-7 days/week     SUBJECTIVE:   Patient seen at bedside.     OBJECTIVE:   No past medical history on file.  Past Surgical History:   Procedure Laterality Date    HIP SURGERY Left 11/30/2024    HIP HEMIARTHROPLASTY performed by Bimal Sin MD at [facility] MAIN OR     Prior Level of Function/Home Situation:   Social/Functional History  Lives With: Other (comment) (Brother and sister in law)  Type of Home: House  Home Layout: One level  Home Access: Stairs to enter with rails  Entrance Stairs - Number of Steps: 6  Entrance Stairs - Rails: Right  Bathroom Shower/Tub: Shower chair with back, Walk-in shower  Bathroom Equipment: Grab bars in shower, Shower chair  Home Equipment: Cane, Walker - Rolling  Prior Level of Assist for ADLs: Independent  Prior Level of Assist for Homemaking: Independent  Prior Level of Assist for Transfers: Independent    Cognitive and Communication Status:  Neurologic State: Alert  Orientation Level: Oriented to person, Oriented to place, and Oriented to situation  Cognition: Follows commands       Motor Speech:  Motor

## 2024-12-04 NOTE — PLAN OF CARE
in 7 days.   Pt will verbalize and demonstrate compliance with fall/hip/stroke precautions  in 7 days.   Pt will demonstrate improvement in sitting balance from poor to fair in 7 days.       PLAN :  Patient continues to benefit from skilled intervention to address functional impairments. Continue treatment per established plan of care to address goals.    Recommendation for discharge: (in order for the patient to meet his/her long term goals)  High intensity and comprehensive skilled physical therapy in a multidisciplinary setting as patient is working towards tolerating up to 3 hours of therapy/day x 5-7 days/week    Potential barriers for safe discharge: pt is a high fall risk, pt is not safe to be alone, and concern for pt safely navigating or managing the home environment.    IF patient discharges home will need the following DME:continuing to assess with progress     SUBJECTIVE:   Patient stated “Is there any way to make this slower?”    OBJECTIVE DATA SUMMARY:   Critical Behavior:  Orientation  Overall Orientation Status: Impaired  Orientation Level: Oriented X4  Cognition  Overall Cognitive Status: Exceptions  Arousal/Alertness: Appears intact  Following Commands: Follows one step commands consistently;Follows multistep commands with increased time;Follows multistep commands with repitition  Attention Span: Attends with cues to redirect  Memory: Appears intact  Safety Judgement: Decreased awareness of need for safety;Decreased awareness of need for assistance  Problem Solving: Decreased awareness of errors;Assistance required to correct errors made;Assistance required to identify errors made;Assistance required to implement solutions;Assistance required to generate solutions;Impaired  Insights: Decreased awareness of deficits  Initiation: Requires cues for some  Sequencing: Requires cues for some    Functional Mobility Training:  Bed Mobility:  Bed Mobility Training  Bed Mobility Training: Yes  Sit to Supine:  Maximum assistance;Assist X2;Additional time  Scooting: Maximum assistance;Assist X2;Additional time  Transfers:  Transfer Training  Transfer Training: Yes  Interventions: Verbal cues;Safety awareness training;Manual cues;Tactile cues;Visual cues;Weight shifting training/pressure relief  Sit to Stand: Maximum assistance;Assist X2;Additional time  Stand to Sit: Assist X2;Additional time;Maximum assistance  Bed to Chair: Total assistance;Assist X2 (hima riddlejohn)  Balance:  Balance  Sitting: Impaired;With support  Sitting - Static: Fair (occasional);Supported sitting  Sitting - Dynamic: Poor (constant support);Supported sitting  Standing: Impaired;With support  Standing - Static: Constant support;Poor  Standing - Dynamic: Not tested    Pain Ratin/10  reported    Activity Tolerance:   Fair  and requires rest breaks    After treatment patient left in no apparent distress:   Bed locked and returned to lowest position, Patient left in no apparent distress in bed, Call bell within reach, Bed/ chair alarm activated, and Side rails x3, and nsg updated     COMMUNICATION/COLLABORATION:   The patient’s plan of care was discussed with: Occupational therapy assistant and Registered nurse PT/OT sessions occurred together for increased safety of pt and clinician.     Patient Education  Education Given To: Patient  Education Provided: Equipment;ADL Adaptive Strategies;Precautions;Transfer Training;Fall Prevention Strategies;Plan of Care;Home Exercise Program  Education Provided Comments: post hip precautions, ROM, positioning, transfers and bed mob  Education Method: Demonstration;Verbal;Teach Back  Education Outcome: Verbalized understanding;Continued education needed      Amy Scott PTA  Minutes: 16

## 2024-12-04 NOTE — CARE COORDINATION
CM reviewed chart. DCP [facility] pending auth when medicallly ready, ref# [id number]. CM will continue to follow.

## 2024-12-04 NOTE — PLAN OF CARE
PHYSICAL THERAPY TREATMENT     Patient: Grant Jacobs (84 y.o. male)  Date: 12/4/2024  Diagnosis: Fracture [T14.8XXA]  Closed fracture of left hip, initial encounter ([facility]) [S72.002A] Fracture  Procedure(s) (LRB):  HIP HEMIARTHROPLASTY (Left) 4 Days Post-Op  Precautions: Fall Risk, Weight Bearing, Bed Alarm, General Precautions, Surgical Protocols Right Lower Extremity Weight Bearing: Weight Bearing As Tolerated                    Recommendations for nursing mobility: Out of bed to chair for meals, Encourage HEP in prep for ADLs/mobility; see handout for details, Frequent repositioning to prevent skin breakdown, Use of bed/chair alarm for safety, Hima Stedy for bed to chair transfers , Assist x2, and post hip precautions    In place during session: External Catheter  Chart, physical therapy assessment, plan of care and goals were reviewed.  ASSESSMENT  Patient continues with skilled PT services and is progressing towards goals. Pt sitting up in the bed upon PT arrival, agreeable to session. Pt A&O x 4. (See below for objective details and assist levels).     Overall pt tolerated session fair today. Pt still req extensive assist of 2 for bed mob and transfers and cont to demonstrate heavy left sided lean. Patient cont to demonstrate flaccidity on the left side req constant support and assist to the left. Completed sit<>stand transfers max A x2 however pt did stand with more upright posture and standing tolerance improved. Pt then completed bed>chair with hima stedy, total A and assist to left side to encourage safe positioning during transfers. Pt positioned well in recliner and completed LE therex, PROM to the left side and AROM on RLE. Will continue to benefit from skilled PT services, and will continue to progress as tolerated. Current PT DC recommendation High intensity and comprehensive skilled physical therapy in a multidisciplinary setting as patient is working towards tolerating up to 3 hours of    Hip abd/add   [] [] [] []    Long Arc Quads  10 [x] [] [x] []    Marching   [] [] [] []    Seated HR/TR   [] [] [] []       [] [] [] []       Pain Ratin/10  reported  Pain Intervention(s):   rest, elevation, and repositioning    Activity Tolerance:   Fair  and requires rest breaks    After treatment patient left in no apparent distress:   Bed locked and returned to lowest position, Patient left in no apparent distress sitting up in chair, Patient left in no apparent distress in bed, and Call bell within reach, and nsg updated     COMMUNICATION/COLLABORATION:   The patient’s plan of care was discussed with: Occupational therapy assistant and Registered nurse PT/OT sessions occurred together for increased safety of pt and clinician.     Patient Education  Education Given To: Patient  Education Provided: Equipment;ADL Adaptive Strategies;Precautions;Transfer Training;Fall Prevention Strategies;Plan of Care;Home Exercise Program  Education Provided Comments: post hip precautions, ROM, positioning, transfers and bed mob  Education Method: Demonstration;Verbal;Teach Back  Education Outcome: Verbalized understanding;Continued education needed      Amy Scott, STEVEN  Minutes: 40

## 2024-12-04 NOTE — PLAN OF CARE
OCCUPATIONAL THERAPY TREATMENT  Patient: Grant Jacobs (84 y.o. male)  Date: 12/4/2024  Primary Diagnosis: Fracture [T14.8XXA]  Closed fracture of left hip, initial encounter ([facility]) [S72.002A]  Procedure(s) (LRB):  HIP HEMIARTHROPLASTY (Left) 4 Days Post-Op   Precautions: Fall Risk, Weight Bearing, Bed Alarm, General Precautions, Surgical Protocols Right Lower Extremity Weight Bearing: Weight Bearing As Tolerated              Recommendations for nursing mobility: Encourage HEP in prep for ADLs/mobility; see handout for details, Frequent repositioning to prevent skin breakdown, and Hima Stedy for bed to chair transfers     In place during session: External Catheter and EKG/telemetry   Chart, occupational therapy assessment, plan of care, and goals were reviewed.  ASSESSMENT  Patient continues with skilled OT services and is progressing towards goals. Pt seated in recliner upon OROZCO arrival, agreeable to session. Pt A&O x 4. Pt required max A x 2 for sit to stand from recliner to hima stedy. Pt also required max A x 2 for bed mobility. Education provided on self rom w/ pt able to return demonstrate w/ fair accuracy.  Would benefit from further education/ practice. (See below for objective details and assist levels).     Overall pt tolerated session fair today with rest breaks.  Current OT recommendations for discharge High intensity and comprehensive skilled occupational therapy in a multidisciplinary setting as patient is working towards tolerating up to 3 hours of therapy/day x 5-7 days/week. Will continue to benefit from skilled OT services, and will continue to progress as tolerated.       GOALS:    Problem: Occupational Therapy - Adult  Goal: By Discharge: Performs self-care activities at highest level of function for planned discharge setting.  See evaluation for individualized goals.  Description: FUNCTIONAL STATUS PRIOR TO ADMISSION:  Pt was independent and active without use of AD for functional mobility and  requires rest breaks    After treatment patient left in no apparent distress:   Bed locked and returned to lowest position, Patient left in no apparent distress in bed, Call bell within reach, Bed/ chair alarm activated, and Side rails x3, and nsg updated     COMMUNICATION/EDUCATION:   The patient’s plan of care was discussed with: Physical therapy assistant and Registered nurse  PT/OT sessions occurred together for increased safety of pt and clinician.    Patient Education  Education Given To: Patient  Education Provided: Role of Therapy;ADL Adaptive Strategies;Fall Prevention Strategies;Plan of Care;Precautions;Transfer Training;Mobility Training;Home Exercise Program;Energy Conservation  Education Method: Verbal  Barriers to Learning: Cognition;Vision  Education Outcome: Continued education needed    Thank you for this referral.  DAGO Minor  Minutes: 16

## 2024-12-04 NOTE — PROGRESS NOTES
Orthopedic Progress Note    Date:2024       Room:[facility]  Patient Name:Grant Jacobs     YOB: 1940     Age:84 y.o.  male     SUBJECTIVE    2024: POD 1 s/p uncomplicated left hip hemiarthroplasty for left femoral neck fracture.  Patient is seen today for follow-up.       Last night patient reportedly developed acute left upper extremity weakness and left facial droop.  He was noted to have a flaccid left arm and mild left-sided facial droop on exam.  Head CT and CTA of the head and neck were obtained by the primary team, came back negative for acute stroke or large vessel occlusion.  He is awaiting an MRI of the brain today.    2024: POD #2 s/p uncomplicated left hip hemiarthroplasty for left femoral neck fracture.  Patient is resting comfortably in bed on time of rounds.  He reports pain to the hip only with movement.  He underwent a brain MRI today for a stroke rule out which was obtained by primary team.    12/3/2024: POD #3 s/p uncomplicated left hip hemiarthroplasty for left femoral neck fracture.  Patient is resting comfortably in bed on times rounds.  Nurses are present at bedside.  He reports his hip pain is improving.  He is still unable to plantar and dorsiflex foot secondary to stroke after surgery.  MRI ordered by primary team revealed right temporal ischemic stroke.    2024: POD #4 uncomplicated left hip hemiarthroplasty for left femoral neck fracture.  Patient is resting comfortably during times of rounds.  He is reports his hip pain is well-controlled.  He has no additional complaints no concerns today.    VITALS         Temp  Av.8 °F (37.1 °C)  Min: 98.1 °F (36.7 °C)  Max: 99.7 °F (37.6 °C)  Pulse  Av.1  Min: 93  Max: 105  Systolic (24hrs), Av , Min:141 , Max:166    Diastolic (24hrs), Av, Min:80, Max:95    Resp  Av.5  Min: 16  Max: 20  SpO2  Av.7 %  Min: 91 %  Max: 98 %  LABS      Recent Labs     24  1227 24  0746   WBC 10.1 9.4

## 2024-12-04 NOTE — PROGRESS NOTES
4 Eyes Skin Assessment     NAME:  Grant Jacobs  YOB: 1940  MEDICAL RECORD NUMBER:  [id number]    The patient is being assessed for  Other Per unit protocol every Wednesday    I agree that at least one RN has performed a thorough Head to Toe Skin Assessment on the patient. ALL assessment sites listed below have been assessed.      Areas assessed by both nurses:    Head, Face, Ears, Shoulders, Back, Chest, Arms, Elbows, Hands, Sacrum. Buttock, Coccyx, Ischium, and Legs. Feet and Heels        Does the Patient have a Wound? No noted wound(s)       Calvin Prevention initiated by RN: Yes  Wound Care Orders initiated by RN: No    Pressure Injury (Stage 3,4, Unstageable, DTI, NWPT, and Complex wounds) if present, place Wound referral order by RN under : No    New Ostomies, if present place, Ostomy referral order under : No     Nurse 1 eSignature: Electronically signed by Elida Malik RN on 12/4/24 at 12:51 AM EST    **SHARE this note so that the co-signing nurse can place an eSignature**    Nurse 2 eSignature: Electronically signed by Jin Bartholomew RN on 12/4/24 at 2:51 AM EST

## 2024-12-04 NOTE — DISCHARGE SUMMARY
Physician Discharge Summary     Patient ID:    Grant Jacobs  [id number]  84 y.o.  1940    Admit date: 11/29/2024    Discharge date : 12/4/2024      Final Diagnoses:   Left hip fracture s/p left hip arthroplasty   Acute right Corona radiata ischemic stroke  Hypertension  Hyperlipidemia  Elevated total bilirubin  Acute kidney injury  Scalp lesion      Reason for Hospitalization:  Mr. Jacobs is an 84-year-old male with history of hypertension who presented to the emergency department 11/29 after mechanical ground-level fall.  Patient states that he fell in the kitchen. No syncopal episode. Did not have episodes of chest pain/palpitations afterwards. Denies head trauma. He has had chronic left knee pain and gradual difficulty ambulating. Otherwise, he states that he is fairly healthy and is not taking any daily medicines as he self discontinued his antihypertensives.      In the ED trauma bravo workup obtained.  Vital signs notable for uncontrolled hypertension.  Labs without leukocytosis, anemia.  CMP notable for creatinine of 1.32, elevated total bilirubin 1.6.  CT of the head negative for acute infarct. CT C-spine negative for fracture.  Left hip x-ray showed acute left femoral neck fracture, confirmed by CT left hip.  Orthopedics consulted and plan for surgical intervention.        Hospital Course:   Patient subsequently admitted to hospitalist service.  Ultrasound of the abdomen obtained given hyperbilirubinemia, cholecystolithiasis showed no evidence of acute cholecystitis.       Patient taken to the OR 11/30 for left hip arthroplasty.     On the evening of 11/30 patient developed acute left upper extremity weakness and left facial droop.  He was noted to have a flaccid left arm and mild left-sided facial droop on exam.  Head CT and CTA of the head and neck were obtained, came back negative for acute stroke or large vessel occlusion.  MRI revealed right temporal ischemic stroke.     ECHO

## 2024-12-04 NOTE — PROGRESS NOTES
Physician Progress Note      PATIENT:               SHIRLEY BORREGO  [facility] #:                  [id number]  :                       1940  ADMIT DATE:       2024 2:07 PM  DISCH DATE:  RESPONDING  PROVIDER #:        Ganesh Bryan MD          QUERY TEXT:    Patient admitted with Femur Fracture. Noted documentation of Acute Kidney   Injury in H&P on .  In order to support the diagnosis of LOREN, please   include additional clinical indicators in your documentation.? Or please   document if the diagnosis of LOREN has been ruled out after further study.  The medical record reflects the following:  Risk Factors: CKD, HTN  Clinical Indicators: IM PN  LOREN versus CKD. IM PN  Likely CKD,   Baseline creatinine unknown, Creatinine 1.21.  Treatment: IV fluids, Avoid NSAIDs    Thank you,  Felicitas Wolfe, FLORIDALMA, S.    Defined by Kidney Disease Improving Global Outcomes (KDIGO) clinical practice   guideline for acute kidney injury:  -Increase in SCr by greater than or equal to 0.3 mg/dl within 48 hours; or  -Increase or decrease in SCr to greater than or equal to 1.5 times baseline,   which is known or presumed to have occurred within the prior 7 days; or  -Urine volume < 0.5ml/kg/h for 6 hours.  Options provided:  -- Acute kidney injury evidenced by, Please document evidence as well as a   numerical baseline creatinine, if known.  -- Acute kidney injury ruled out after study and CKD confirmed.  -- Other - I will add my own diagnosis  -- Disagree - Not applicable / Not valid  -- Disagree - Clinically unable to determine / Unknown  -- Refer to Clinical Documentation Reviewer    PROVIDER RESPONSE TEXT:    Acute kidney injury was ruled out after study and CKD confirmed.    Query created by: Felicitas Wolfe on 12/3/2024 5:10 AM      Electronically signed by:  Ganesh Bryan MD 2024 7:35 AM

## 2024-12-05 VITALS
HEART RATE: 83 BPM | TEMPERATURE: 97.9 F | DIASTOLIC BLOOD PRESSURE: 82 MMHG | RESPIRATION RATE: 17 BRPM | WEIGHT: 165.5 LBS | OXYGEN SATURATION: 93 % | SYSTOLIC BLOOD PRESSURE: 151 MMHG | HEIGHT: 69 IN | BODY MASS INDEX: 24.51 KG/M2

## 2024-12-05 PROCEDURE — 6370000000 HC RX 637 (ALT 250 FOR IP): Performed by: INTERNAL MEDICINE

## 2024-12-05 PROCEDURE — 2580000003 HC RX 258: Performed by: STUDENT IN AN ORGANIZED HEALTH CARE EDUCATION/TRAINING PROGRAM

## 2024-12-05 PROCEDURE — 92507 TX SP LANG VOICE COMM INDIV: CPT

## 2024-12-05 PROCEDURE — 97530 THERAPEUTIC ACTIVITIES: CPT

## 2024-12-05 PROCEDURE — 92526 ORAL FUNCTION THERAPY: CPT

## 2024-12-05 PROCEDURE — 6370000000 HC RX 637 (ALT 250 FOR IP): Performed by: STUDENT IN AN ORGANIZED HEALTH CARE EDUCATION/TRAINING PROGRAM

## 2024-12-05 PROCEDURE — 6370000000 HC RX 637 (ALT 250 FOR IP): Performed by: PSYCHIATRY & NEUROLOGY

## 2024-12-05 RX ADMIN — CLOPIDOGREL BISULFATE 75 MG: 75 TABLET ORAL at 10:25

## 2024-12-05 RX ADMIN — SODIUM CHLORIDE, PRESERVATIVE FREE 10 ML: 5 INJECTION INTRAVENOUS at 10:25

## 2024-12-05 RX ADMIN — AMLODIPINE BESYLATE 5 MG: 5 TABLET ORAL at 10:25

## 2024-12-05 RX ADMIN — PANTOPRAZOLE SODIUM 40 MG: 40 TABLET, DELAYED RELEASE ORAL at 05:22

## 2024-12-05 RX ADMIN — OXYCODONE HYDROCHLORIDE AND ACETAMINOPHEN 1 TABLET: 5; 325 TABLET ORAL at 06:40

## 2024-12-05 RX ADMIN — ASPIRIN 81 MG 81 MG: 81 TABLET ORAL at 10:25

## 2024-12-05 RX ADMIN — OXYCODONE HYDROCHLORIDE AND ACETAMINOPHEN 1 TABLET: 5; 325 TABLET ORAL at 11:52

## 2024-12-05 ASSESSMENT — PAIN DESCRIPTION - DESCRIPTORS
DESCRIPTORS: ACHING;DISCOMFORT
DESCRIPTORS: ACHING;SORE

## 2024-12-05 ASSESSMENT — PAIN DESCRIPTION - LOCATION
LOCATION: HIP
LOCATION: HIP

## 2024-12-05 ASSESSMENT — PAIN SCALES - GENERAL
PAINLEVEL_OUTOF10: 7
PAINLEVEL_OUTOF10: 8
PAINLEVEL_OUTOF10: 0
PAINLEVEL_OUTOF10: 0
PAINLEVEL_OUTOF10: 3

## 2024-12-05 ASSESSMENT — PAIN - FUNCTIONAL ASSESSMENT: PAIN_FUNCTIONAL_ASSESSMENT: PREVENTS OR INTERFERES SOME ACTIVE ACTIVITIES AND ADLS

## 2024-12-05 ASSESSMENT — PAIN DESCRIPTION - ORIENTATION
ORIENTATION: LEFT
ORIENTATION: LEFT

## 2024-12-05 NOTE — PLAN OF CARE
Speech LAnguage Pathology Dysphagia and Speech/ language/ cognitive-linguistic TREATMENT    Patient: Grant Jacobs (84 y.o. male)  Date: 12/5/2024  Primary Diagnosis: Fracture [T14.8XXA]  Closed fracture of left hip, initial encounter ([facility]) [S72.002A]  Procedure(s) (LRB):  HIP HEMIARTHROPLASTY (Left) 5 Days Post-Op   Precautions: Aspiration, Fall Risk, Weight Bearing, Bed Alarm, General Precautions, Surgical Protocols Right Lower Extremity Weight Bearing: Weight Bearing As Tolerated                Time In: 1025  Time Out: 1044    DIET RECOMMENDATIONS: Minced and moist and thin liquids, meds as tolerated.   May upgrade to Soft and Bite-sized Solids if patient requests.     SWALLOW SAFETY PRECAUTIONS: Rec slow rate of intake, small bites/sips, and remain upright 30 minutes after PO intake.     COMMUNICATION RECOMMENDATIONS: Present information slowly, simply, and with increased speaker volume. Allow extra time for processing information and sequencing/problem-solving within functional tasks. Provide reminders of important information.   Check for comprehension--patient not reliable to indicate that has not understood.     ASSESSMENT :    Based on the objective data described below, the patient presents with   Mild Oropharyngeal Dysphagia,   Moderate Cognitive-Linguistic Deficits,   Mild Dysarthria    Awake and alert. Upright in bed. Flat affect. Limited initiation.   Reports liking minced and moist solids.   Indicates limited dentition affects tolerance of advanced texture solids.   States appointment to have teeth pulled and get dentures scheduled.   RN reports tolerating current diet of Minced and Moist Solids / Thin liquids, meds whole w/ thin liquid.   Current d/c disposition SNF per chart.     Oral mucosa pink, moist  Significantly reduced dentition, perhaps malocclusion.   Lingual, labial, and mandibular range of motion, coordination grossly WFL.   Velum elevates upon phonation.   +volitional swallow,  additional assessment of visuospatial skills within 7day(s).    -Patient/caregiver goal: to be able to remember information better           Outcome: Progressing          PLAN :  Recommendations and Planned Interventions:  DIET RECOMMENDATIONS: Minced and moist and thin liquids, meds as tolerated.   May upgrade to Soft and Bite-sized Solids if patient requests.     SWALLOW SAFETY PRECAUTIONS: Rec slow rate of intake, small bites/sips, and remain upright 30 minutes after PO intake.     COMMUNICATION RECOMMENDATIONS: Present information slowly, simply, and with increased speaker volume. Allow extra time for processing information and sequencing/problem-solving within functional tasks. Provide reminders of important information.   Check for comprehension--patient not reliable to indicate that has not understood.   Acute SLP Services: Yes, patient will be followed by speech-language pathology 5x/week to address goals. Patient's rehabilitation potential is considered to be Excellent.  Discharge Recommendations:  High intensity and comprehensive skilled speech therapy in a multidisciplinary setting as patient is working towards tolerating up to 3 hours of therapy/day x 5-7 days/week     SUBJECTIVE:  Awake and alert. Upright in bed. Flat affect. Limited initiation.   OBJECTIVE:   No past medical history on file.  Past Surgical History:   Procedure Laterality Date    HIP SURGERY Left 11/30/2024    HIP HEMIARTHROPLASTY performed by Bimal Sin MD at [facility] MAIN OR     Prior Level of Function/Home Situation:   Social/Functional History  Lives With: Other (comment) (Brother and sister in law)  Type of Home: House  Home Layout: One level  Home Access: Stairs to enter with rails  Entrance Stairs - Number of Steps: 6  Entrance Stairs - Rails: Right  Bathroom Shower/Tub: Shower chair with back, Walk-in shower  Bathroom Equipment: Grab bars in shower, Shower chair  Home Equipment: Cane, Walker - Rolling  Prior Level of Assist for

## 2024-12-05 NOTE — PROGRESS NOTES
OCCUPATIONAL THERAPY TREATMENT  Patient: Grant Jacobs (84 y.o. male)  Date: 12/5/2024  Primary Diagnosis: Fracture [T14.8XXA]  Closed fracture of left hip, initial encounter ([facility]) [S72.002A]  Procedure(s) (LRB):  HIP HEMIARTHROPLASTY (Left) 5 Days Post-Op   Precautions: Fall Risk, Weight Bearing, Bed Alarm, General Precautions, Surgical Protocols Right Lower Extremity Weight Bearing: Weight Bearing As Tolerated              Recommendations for nursing mobility: Out of bed to chair for meals, Encourage HEP in prep for ADLs/mobility; see handout for details, Frequent repositioning to prevent skin breakdown, Cassy Stedy for bed to chair transfers , and Assist x2    In place during session: External Catheter and EKG/telemetry   Chart, occupational therapy assessment, plan of care, and goals were reviewed.  ASSESSMENT  Patient continues with skilled OT services and is progressing towards goals. Pt semi supine in bed upon OROZCO arrival, agreeable to session. Pt A&O x 4. PROM completed w/ L UE w/ trace muscle movement noted in shoulder, see grid below for details.  Pt completed bed mobility w/ max a x 2 and tolerated eob for ~ 12-15 minutes.  Sitting balance w/ noted improvement and pt following direction improved. Pt TA w/ donning sling. Pt completed 1 sts w/ leana walker and max A x 2. Pt able to move toes/ heel on R LE while standing in preparation for functional t/f's. Pt returned to semi supine in bed. Reviewed self rom exercises w/ pt verbalizing fair understanding, would benefit from further education/ practice. BE FAST hand out given and reviewed w/ pt verbalizing good understanding. Pt left semi supine in bed with all known needs met.(See below for objective details and assist levels).     Overall pt tolerated session fair today with rest breaks. Current OT recommendations for discharge High intensity and comprehensive skilled occupational therapy in a multidisciplinary setting as patient is working towards

## 2024-12-05 NOTE — DISCHARGE INSTR - COC
Continuity of Care Form    Patient Name: Grant Moseley   :  1940  MRN:  [id number]    Admit date:  2024  Discharge date:  2024    Code Status Order: Full Code   Advance Directives:   Advance Care Flowsheet Documentation             Admitting Physician:  Parris Hinton MD  PCP: None, None    Discharging Nurse: Joanne Mckeon  Discharging Hospital Unit/Room#: 569/01  Discharging Unit Phone Number: [phone]    Emergency Contact:   Extended Emergency Contact Information  Primary Emergency Contact: Fidencio moseley  Home Phone: [phone]  Mobile Phone: [phone]  Relation: Child   needed? No    Past Surgical History:  Past Surgical History:   Procedure Laterality Date    HIP SURGERY Left 2024    HIP HEMIARTHROPLASTY performed by Bimal Sin MD at [facility] MAIN OR       Immunization History:     There is no immunization history on file for this patient.    Active Problems:  Patient Active Problem List   Diagnosis Code    Fracture T14.8XXA       Isolation/Infection:   Isolation            No Isolation          Patient Infection Status       None to display            Nurse Assessment:  Last Vital Signs: BP (!) 150/87   Pulse 86   Temp 98.2 °F (36.8 °C) (Oral)   Resp 18   Ht 1.753 m (5' 9\")   Wt 75.1 kg (165 lb 8 oz)   SpO2 95%   BMI 24.44 kg/m²     Last documented pain score (0-10 scale): Pain Level: 0  Last Weight:   Wt Readings from Last 1 Encounters:   24 75.1 kg (165 lb 8 oz)     Mental Status:  oriented    IV Access:  - None    Nursing Mobility/ADLs:  Walking   Assisted  Transfer  Assisted  Bathing  Assisted  Dressing  Assisted  Toileting  Assisted  Feeding  Assisted  Med Admin  Assisted  Med Delivery   whole    Wound Care Documentation and Therapy:  Incision 24 Thigh Left;Proximal (Active)   Dressing Status Clean;Dry;Intact 24   Incision Cleansed Not Cleansed 24   Dressing/Treatment Silver dressing 24   Closure Sutures;Surgical

## 2024-12-05 NOTE — PLAN OF CARE
day(s).        -Patient will perform simple attention tasks with min level assistance (e.g., naming x10 items during one-minute timed divergent naming, serial subtraction) given min cues within 7 day(s).      -Patient will perform simple functional or written sequencing tasks with 60% accuracy within 7 day(s).      -Patient will participate in additional assessment of visuospatial skills within 7day(s).    -Patient/caregiver goal: to be able to remember information better           12/4/2024 1512 by Josi Watson, SLP  Outcome: Progressing     Problem: ABCDS Injury Assessment  Goal: Absence of physical injury  Outcome: Progressing  Flowsheets (Taken 12/3/2024 2123)  Absence of Physical Injury: Implement safety measures based on patient assessment

## 2024-12-05 NOTE — PLAN OF CARE
PHYSICAL THERAPY TREATMENT     Patient: Grant Jacobs (84 y.o. male)  Date: 12/5/2024  Diagnosis: Fracture [T14.8XXA]  Closed fracture of left hip, initial encounter ([facility]) [S72.002A] Fracture  Procedure(s) (LRB):  HIP HEMIARTHROPLASTY (Left) 5 Days Post-Op  Precautions: Fall Risk, Weight Bearing, Bed Alarm, General Precautions, Surgical Protocols Right Lower Extremity Weight Bearing: Weight Bearing As Tolerated                    Recommendations for nursing mobility: Encourage HEP in prep for ADLs/mobility; see handout for details, Frequent repositioning to prevent skin breakdown, LE elevation for management of edema, Cassy Stedy for bed to chair transfers , and Assist x2    In place during session: External Catheter  Chart, physical therapy assessment, plan of care and goals were reviewed.  ASSESSMENT  Patient continues with skilled PT services and is progressing towards goals. Pt in bed upon PT arrival, agreeable to session. Pt A&O x 4. (See below for objective details and assist levels).     Overall pt tolerated session fair today with improved mobility and EOB balance. Patient completed in bed therex with no active movement noted on LLE. Patient completed max A x2 sit<>stand transfers today and able to stand increased time, approx 1 min. Patient req cues for upright posture and still leans to the left however much improved today. Patient sitting EOB greatly improved as well while using RUE support on the R hand rail. Patient req encouragement at time to cont to use R side as much as possible throughout mobility due to inability to move left but appears to have slower processing at times with tasks. Will continue to benefit from skilled PT services, and will continue to progress as tolerated. Current PT DC recommendation High intensity and comprehensive skilled physical therapy in a multidisciplinary setting as patient is working towards tolerating up to 3 hours of therapy/day x 5-7 days/week once    [] [] [] []    Heel Slides   [] [] [] []    Straight Leg Raises   [] [] [] []    Hip abd/add  10 [x] [] [x] []    Long Arc Quads   [] [] [] []    Marching   [] [] [] []    Seated HR/TR   [] [] [] []       [] [] [] []       Pain Ratin/10 reported L hip    Activity Tolerance:   Fair  and requires rest breaks    After treatment patient left in no apparent distress:   Bed locked and returned to lowest position, Patient left in no apparent distress in bed and Call bell within reach, and nsg updated     COMMUNICATION/COLLABORATION:   The patient’s plan of care was discussed with: Occupational therapy assistant and Registered nurse PT/OT sessions occurred together for increased safety of pt and clinician.     Patient Education  Education Given To: Patient  Education Provided: Transfer Training;Home Exercise Program;Mobility Training;Role of Therapy;Plan of Care;Equipment  Education Provided Comments: post hip precautions, ROM, positioning, transfers and bed mob, BEFAST  Education Method: Demonstration;Verbal;Teach Back  Barriers to Learning: Cognition (little carryover)  Education Outcome: Verbalized understanding;Continued education needed      Amy Scott, PTA  Minutes: 43

## 2024-12-05 NOTE — PLAN OF CARE
Problem: Discharge Planning  Goal: Discharge to home or other facility with appropriate resources  12/5/2024 0714 by Kathleen Lopez RN  Outcome: Progressing  Flowsheets (Taken 12/5/2024 0714)  Discharge to home or other facility with appropriate resources:   Identify barriers to discharge with patient and caregiver   Arrange for needed discharge resources and transportation as appropriate  12/4/2024 2347 by Elida Malik RN  Outcome: Progressing  Flowsheets  Taken 12/4/2024 2010 by Elida Malik RN  Discharge to home or other facility with appropriate resources: Identify barriers to discharge with patient and caregiver  Taken 12/4/2024 1104 by Geraldine Babb RN  Discharge to home or other facility with appropriate resources: Identify barriers to discharge with patient and caregiver     Problem: Pain  Goal: Verbalizes/displays adequate comfort level or baseline comfort level  12/5/2024 0714 by Kathleen Lopez RN  Outcome: Progressing  Flowsheets (Taken 12/5/2024 0714)  Verbalizes/displays adequate comfort level or baseline comfort level:   Encourage patient to monitor pain and request assistance   Assess pain using appropriate pain scale  12/4/2024 2347 by Elida Malik RN  Outcome: Progressing  Flowsheets (Taken 12/3/2024 0912 by Kathleen Lopez RN)  Verbalizes/displays adequate comfort level or baseline comfort level:   Encourage patient to monitor pain and request assistance   Assess pain using appropriate pain scale     Problem: Skin/Tissue Integrity  Goal: Absence of new skin breakdown  Description: 1.  Monitor for areas of redness and/or skin breakdown  2.  Assess vascular access sites hourly  3.  Every 4-6 hours minimum:  Change oxygen saturation probe site  4.  Every 4-6 hours:  If on nasal continuous positive airway pressure, respiratory therapy assess nares and determine need for appliance change or resting period.  12/5/2024 0714 by Kathleen Lopez RN  Outcome: Progressing  12/4/2024

## 2024-12-05 NOTE — CARE COORDINATION
CM noted discharge order. Patient going to [facility] to pickup at 1p, nurse can call report at [phone].    Transition of Care Plan:    RUR: 14%  Prior Level of Functioning: Needs Assistance  Disposition: IRF  KAYLA: Today  If SNF or IPR: Date FOC offered: 12/4/2024  Date FOC received: 12/4/2024  Accepting facility: [facility]  Date authorization started with reference number: n/a  Date authorization received and expires: n/a  Follow up appointments: Per MD  DME needed: n/a  Transportation at discharge: [facility]  IM/IMM Medicare/ letter given: yes  Is patient a  and connected with [facility]? N/a   If yes, was  transfer form completed and [facility] notified?   Caregiver Contact: n/a  Discharge Caregiver contacted prior to discharge? N/a  Care Conference needed? no  Barriers to discharge: none

## 2024-12-05 NOTE — DISCHARGE SUMMARY
Physician Discharge Summary     Patient ID:    Grant Jacobs  [id number]  84 y.o.  1940    Admit date: 11/29/2024    Discharge date : 12/5/2024      Final Diagnoses:   Left hip fracture s/p left hip arthroplasty   Acute right [place] radiata ischemic stroke  Hypertension  Hyperlipidemia  Elevated total bilirubin  Acute kidney injury  Scalp lesion      Reason for Hospitalization:  Mr. Jacobs is an 84-year-old male with history of hypertension who presented to the emergency department 11/29 after mechanical ground-level fall.  Patient states that he fell in the kitchen. No syncopal episode. Did not have episodes of chest pain/palpitations afterwards. Denies head trauma. He has had chronic left knee pain and gradual difficulty ambulating. Otherwise, he states that he is fairly healthy and is not taking any daily medicines as he self discontinued his antihypertensives.      In the ED trauma bravo workup obtained.  Vital signs notable for uncontrolled hypertension.  Labs without leukocytosis, anemia.  CMP notable for creatinine of 1.32, elevated total bilirubin 1.6.  CT of the head negative for acute infarct. CT C-spine negative for fracture.  Left hip x-ray showed acute left femoral neck fracture, confirmed by CT left hip.  Orthopedics consulted and plan for surgical intervention.        Hospital Course:   Patient subsequently admitted to hospitalist service.  Ultrasound of the abdomen obtained given hyperbilirubinemia, cholecystolithiasis showed no evidence of acute cholecystitis.       Patient taken to the OR 11/30 for left hip arthroplasty.     On the evening of 11/30 patient developed acute left upper extremity weakness and left facial droop.  He was noted to have a flaccid left arm and mild left-sided facial droop on exam.  Head CT and CTA of the head and neck were obtained, came back negative for acute stroke or large vessel occlusion.  MRI revealed right temporal ischemic stroke.     ECHO

## 2024-12-07 LAB
BACTERIA SPEC CULT: NORMAL
BACTERIA SPEC CULT: NORMAL
Lab: NORMAL
Lab: NORMAL

## 2024-12-08 ENCOUNTER — HOSPITAL ENCOUNTER (OUTPATIENT)
Facility: HOSPITAL | Age: 84
Setting detail: SPECIMEN
Discharge: HOME | End: 2024-12-11

## 2024-12-08 LAB
ALBUMIN SERPL-MCNC: 2.4 G/DL (ref 3.5–5)
ALBUMIN/GLOB SERPL: 0.6 (ref 1.1–2.2)
ALP SERPL-CCNC: 181 U/L (ref 45–117)
ALT SERPL-CCNC: 40 U/L (ref 12–78)
ANION GAP SERPL CALC-SCNC: 6 MMOL/L (ref 2–12)
AST SERPL W P-5'-P-CCNC: 43 U/L (ref 15–37)
BILIRUB SERPL-MCNC: 1.8 MG/DL (ref 0.2–1)
BUN SERPL-MCNC: 19 MG/DL (ref 6–20)
BUN/CREAT SERPL: 19 (ref 12–20)
CA-I BLD-MCNC: 8.5 MG/DL (ref 8.5–10.1)
CHLORIDE SERPL-SCNC: 104 MMOL/L (ref 97–108)
CO2 SERPL-SCNC: 28 MMOL/L (ref 21–32)
CREAT SERPL-MCNC: 0.99 MG/DL (ref 0.7–1.3)
GLOBULIN SER CALC-MCNC: 4 G/DL (ref 2–4)
GLUCOSE SERPL-MCNC: 108 MG/DL (ref 65–100)
POTASSIUM SERPL-SCNC: 3.9 MMOL/L (ref 3.5–5.1)
PROT SERPL-MCNC: 6.4 G/DL (ref 6.4–8.2)
SODIUM SERPL-SCNC: 138 MMOL/L (ref 136–145)

## 2024-12-08 PROCEDURE — 80053 COMPREHEN METABOLIC PANEL: CPT

## 2024-12-09 PROCEDURE — 80048 BASIC METABOLIC PNL TOTAL CA: CPT

## 2024-12-09 PROCEDURE — 86706 HEP B SURFACE ANTIBODY: CPT

## 2024-12-09 PROCEDURE — 85025 COMPLETE CBC W/AUTO DIFF WBC: CPT

## 2024-12-10 ENCOUNTER — HOSPITAL ENCOUNTER (OUTPATIENT)
Facility: HOSPITAL | Age: 84
Setting detail: SPECIMEN
Discharge: HOME | End: 2024-12-13

## 2024-12-10 LAB
ANION GAP SERPL CALC-SCNC: 7 MMOL/L (ref 2–12)
BASOPHILS # BLD: 0.1 K/UL (ref 0–0.1)
BASOPHILS NFR BLD: 1 % (ref 0–1)
BUN SERPL-MCNC: 22 MG/DL (ref 6–20)
BUN/CREAT SERPL: 21 (ref 12–20)
CA-I BLD-MCNC: 8.5 MG/DL (ref 8.5–10.1)
CHLORIDE SERPL-SCNC: 103 MMOL/L (ref 97–108)
CO2 SERPL-SCNC: 25 MMOL/L (ref 21–32)
CREAT SERPL-MCNC: 1.04 MG/DL (ref 0.7–1.3)
DIFFERENTIAL METHOD BLD: ABNORMAL
EOSINOPHIL # BLD: 0.1 K/UL (ref 0–0.4)
EOSINOPHIL NFR BLD: 1 % (ref 0–7)
ERYTHROCYTE [DISTWIDTH] IN BLOOD BY AUTOMATED COUNT: 13.8 % (ref 11.5–14.5)
GLUCOSE SERPL-MCNC: 145 MG/DL (ref 65–100)
HBV SURFACE AB SER QL: NONREACTIVE
HBV SURFACE AB SER-ACNC: <3.1 MIU/ML
HCT VFR BLD AUTO: 34.4 % (ref 36.6–50.3)
HGB BLD-MCNC: 10.7 G/DL (ref 12.1–17)
IMM GRANULOCYTES # BLD AUTO: 0.1 K/UL (ref 0–0.04)
IMM GRANULOCYTES NFR BLD AUTO: 1 % (ref 0–0.5)
LYMPHOCYTES # BLD: 1.4 K/UL (ref 0.8–3.5)
LYMPHOCYTES NFR BLD: 13 % (ref 12–49)
MCH RBC QN AUTO: 29.8 PG (ref 26–34)
MCHC RBC AUTO-ENTMCNC: 31.1 G/DL (ref 30–36.5)
MCV RBC AUTO: 95.8 FL (ref 80–99)
MONOCYTES # BLD: 0.7 K/UL (ref 0–1)
MONOCYTES NFR BLD: 7 % (ref 5–13)
NEUTS SEG # BLD: 8.3 K/UL (ref 1.8–8)
NEUTS SEG NFR BLD: 77 % (ref 32–75)
NRBC # BLD: 0 K/UL (ref 0–0.01)
NRBC BLD-RTO: 0 PER 100 WBC
PLATELET # BLD AUTO: 352 K/UL (ref 150–400)
PMV BLD AUTO: 9.8 FL (ref 8.9–12.9)
POTASSIUM SERPL-SCNC: 4.1 MMOL/L (ref 3.5–5.1)
RBC # BLD AUTO: 3.59 M/UL (ref 4.1–5.7)
SODIUM SERPL-SCNC: 135 MMOL/L (ref 136–145)
WBC # BLD AUTO: 10.8 K/UL (ref 4.1–11.1)

## 2024-12-26 ENCOUNTER — TELEPHONE (OUTPATIENT)
Age: 84
End: 2024-12-26

## 2024-12-26 NOTE — TELEPHONE ENCOUNTER
Yeni LANGFORD from [facility] called to schedule a PO appt for Pt Lt Hip with Dr. Reddy. Advised Yeni that Dr. Irizarry would be in office on 12/27 and he could be seen then since we try to keep locum patients with locum providers. Yeni stated that the Pt discharges tomorrow and would likely miss the appt. Advised Yeni that it would be best to see the Pt in office tomorrow to make sure the pt was on track. Yeni stated that it would be best to push out the appt until Jan 24th, the next time a Locum provider would be in office. Pt scheduled for 1/24 with Dr. Murdock.

## 2025-02-26 ENCOUNTER — TELEPHONE (OUTPATIENT)
Age: 85
End: 2025-02-26

## 2025-02-26 NOTE — TELEPHONE ENCOUNTER
Placed outbound call to Pt son to confirm appt with Dr. Murdock on 2/28. Pt son confirmed and stated that extra help maybe needed to get the Pt XR done for Pt. Pt son stated that because of the stroke the Pt sustained during SX that his entire left side is immobile and that he is in a power chair. Pt son did not know if this were to complicate the XR and wanted to make sure the office knew.

## 2025-02-27 NOTE — PROGRESS NOTES
You are going orthopedic Ambulatory Note         Patient Name:Grant Jacobs     YOB: 1940     Age:84 y.o.      male     SUBJECTIVE    SURGERY: Left hip hemiarthroplasty, press-fit  SURGEON: January  DOS: 11/30/2024 2/28/2025: 84-year-old male status post left hip hemiarthroplasty, press-fit.  Unfortunately the patient had a right hemispherical CVA affecting motor of the left upper extremity and lower extremity.  He is currently in home health neuro rehab.  He has not had any issues with regards to his left hip hemiarthroplasty.  He is here with his son.  He is currently in a motorized wheelchair.  He has not been able to walk.  He has started some standing transitions.  Still working on balance.    The patient denies any calf pain, shortness of breath, chest pain, fevers, or chills.      Physical Exam   AAOx 3  Non-labored Respirations  Abdomen Non-tender    Left hip: Well-healed posterior lateral incision.  No motor to the left lower extremity.  Knee range of motion is approximately 0 - 20 - 95.  Ankle range of motion is within functional limits.  Left elbow and wrist is within functional limits.  Left shoulder is limited to less than 90 degrees of forward flexion, less than 90 degrees of abduction, 35 degree rotation arc with arm at his side.  Strong distal pulses in both upper and lower extremity.    IMAGING   Radiographs were independently reviewed and interpreted.  Left hip radiographs demonstrate well-positioned left hip hemiarthroplasty press-fit.  There is no subsidence or fracture.    ASSESSMENT/PLAN        84-year-old male 3-month status post left hip hemiarthroplasty, press-fit.  Subsequently developed right sided CVA affecting the left upper and lower extremity motor.    DVT Proph: Completed from a orthopedic standpoint, defer to primary and neuro care team for any further prophylaxis  Follow-Up: As needed  Radiographs at Follow-Up: Left hip 2 view as needed  Weight Bearing Status:

## 2025-02-28 ENCOUNTER — OFFICE VISIT (OUTPATIENT)
Age: 85
End: 2025-02-28

## 2025-02-28 DIAGNOSIS — Z47.89 ORTHOPEDIC AFTERCARE: Primary | ICD-10-CM

## 2025-02-28 PROCEDURE — 99024 POSTOP FOLLOW-UP VISIT: CPT | Performed by: ORTHOPAEDIC SURGERY

## 2025-02-28 RX ORDER — HYDRALAZINE HYDROCHLORIDE 10 MG/1
TABLET, FILM COATED ORAL
COMMUNITY
Start: 2025-01-29

## 2025-02-28 RX ORDER — TAMSULOSIN HYDROCHLORIDE 0.4 MG/1
CAPSULE ORAL DAILY
COMMUNITY
Start: 2025-01-27

## 2025-02-28 RX ORDER — GABAPENTIN 100 MG/1
CAPSULE ORAL
COMMUNITY
Start: 2025-01-27

## 2025-02-28 RX ORDER — AMANTADINE HYDROCHLORIDE 100 MG/1
CAPSULE, GELATIN COATED ORAL
COMMUNITY
Start: 2025-02-06

## 2025-02-28 RX ORDER — BUSPIRONE HYDROCHLORIDE 5 MG/1
TABLET ORAL
COMMUNITY
Start: 2025-01-27

## 2025-02-28 RX ORDER — METHYLPHENIDATE HYDROCHLORIDE 5 MG/1
TABLET ORAL
COMMUNITY
Start: 2025-01-02

## 2025-02-28 ASSESSMENT — PATIENT HEALTH QUESTIONNAIRE - PHQ9
SUM OF ALL RESPONSES TO PHQ QUESTIONS 1-9: 0
SUM OF ALL RESPONSES TO PHQ9 QUESTIONS 1 & 2: 0
1. LITTLE INTEREST OR PLEASURE IN DOING THINGS: NOT AT ALL
2. FEELING DOWN, DEPRESSED OR HOPELESS: NOT AT ALL
SUM OF ALL RESPONSES TO PHQ QUESTIONS 1-9: 0

## 2025-02-28 NOTE — PROGRESS NOTES
Identified pt with two pt identifiers (name and ). Reviewed chart in preparation for visit and have obtained necessary documentation.     Grant Jacobs is a 84 y.o. male    Chief Complaint   Patient presents with    Post-Op Check     4 month s/p Left hip Sx 2024     There were no vitals taken for this visit.     1. Have you been to the ER, urgent care clinic since your last visit?  Hospitalized since your last visit?no     2. Have you seen or consulted any other health care providers outside of the [facility] since your last visit?  Include any pap smears or colon screening. no

## 2025-08-14 ENCOUNTER — HOSPITAL ENCOUNTER (INPATIENT)
Facility: HOSPITAL | Age: 85
LOS: 6 days | Discharge: HOME | DRG: 391 | End: 2025-08-20
Attending: EMERGENCY MEDICINE | Admitting: FAMILY MEDICINE
Payer: MEDICARE

## 2025-08-14 ENCOUNTER — APPOINTMENT (OUTPATIENT)
Facility: HOSPITAL | Age: 85
DRG: 391 | End: 2025-08-14
Payer: MEDICARE

## 2025-08-14 DIAGNOSIS — K62.5 RECTAL BLEEDING: ICD-10-CM

## 2025-08-14 DIAGNOSIS — K52.9 COLITIS: ICD-10-CM

## 2025-08-14 DIAGNOSIS — R00.1 BRADYCARDIA: Primary | ICD-10-CM

## 2025-08-14 LAB
ALBUMIN SERPL-MCNC: 2.4 G/DL (ref 3.5–5)
ALBUMIN/GLOB SERPL: 0.7 (ref 1.1–2.2)
ALP SERPL-CCNC: 82 U/L (ref 45–117)
ALT SERPL-CCNC: 13 U/L (ref 12–78)
ANION GAP SERPL CALC-SCNC: 8 MMOL/L (ref 2–12)
APPEARANCE UR: ABNORMAL
AST SERPL W P-5'-P-CCNC: 11 U/L (ref 15–37)
BACTERIA URNS QL MICRO: NEGATIVE /HPF
BASOPHILS # BLD: 0.05 K/UL (ref 0–0.1)
BASOPHILS NFR BLD: 0.4 % (ref 0–1)
BILIRUB SERPL-MCNC: 0.6 MG/DL (ref 0.2–1)
BILIRUB UR QL: NEGATIVE
BUN SERPL-MCNC: 13 MG/DL (ref 6–20)
BUN/CREAT SERPL: 15 (ref 12–20)
CA-I BLD-MCNC: 8.5 MG/DL (ref 8.5–10.1)
CHLORIDE SERPL-SCNC: 110 MMOL/L (ref 97–108)
CO2 SERPL-SCNC: 24 MMOL/L (ref 21–32)
COLOR UR: ABNORMAL
CREAT SERPL-MCNC: 0.89 MG/DL (ref 0.7–1.3)
DIFFERENTIAL METHOD BLD: ABNORMAL
EOSINOPHIL # BLD: 0.17 K/UL (ref 0–0.4)
EOSINOPHIL NFR BLD: 1.3 % (ref 0–7)
EPITH CASTS URNS QL MICRO: ABNORMAL /LPF
ERYTHROCYTE [DISTWIDTH] IN BLOOD BY AUTOMATED COUNT: 13.3 % (ref 11.5–14.5)
GLOBULIN SER CALC-MCNC: 3.3 G/DL (ref 2–4)
GLUCOSE SERPL-MCNC: 178 MG/DL (ref 65–100)
GLUCOSE UR STRIP.AUTO-MCNC: NEGATIVE MG/DL
HCT VFR BLD AUTO: 28.5 % (ref 36.6–50.3)
HCT VFR BLD AUTO: 30.3 % (ref 36.6–50.3)
HGB BLD-MCNC: 10 G/DL (ref 12.1–17)
HGB BLD-MCNC: 9.6 G/DL (ref 12.1–17)
HGB UR QL STRIP: NEGATIVE
IMM GRANULOCYTES # BLD AUTO: 0.1 K/UL (ref 0–0.04)
IMM GRANULOCYTES NFR BLD AUTO: 0.8 % (ref 0–0.5)
INR PPP: 1.2 (ref 0.9–1.1)
KETONES UR QL STRIP.AUTO: NEGATIVE MG/DL
LEUKOCYTE ESTERASE UR QL STRIP.AUTO: NEGATIVE
LYMPHOCYTES # BLD: 1.76 K/UL (ref 0.8–3.5)
LYMPHOCYTES NFR BLD: 13.8 % (ref 12–49)
MCH RBC QN AUTO: 29.6 PG (ref 26–34)
MCHC RBC AUTO-ENTMCNC: 33 G/DL (ref 30–36.5)
MCV RBC AUTO: 89.6 FL (ref 80–99)
MONOCYTES # BLD: 0.89 K/UL (ref 0–1)
MONOCYTES NFR BLD: 7 % (ref 5–13)
MUCOUS THREADS URNS QL MICRO: ABNORMAL /LPF
NEUTS SEG # BLD: 9.8 K/UL (ref 1.8–8)
NEUTS SEG NFR BLD: 76.7 % (ref 32–75)
NITRITE UR QL STRIP.AUTO: NEGATIVE
NRBC # BLD: 0 K/UL (ref 0–0.01)
NRBC BLD-RTO: 0 PER 100 WBC
PH UR STRIP: 5 (ref 5–8)
PLATELET # BLD AUTO: 329 K/UL (ref 150–400)
PMV BLD AUTO: 9.6 FL (ref 8.9–12.9)
POTASSIUM SERPL-SCNC: 3.2 MMOL/L (ref 3.5–5.1)
PROT SERPL-MCNC: 5.7 G/DL (ref 6.4–8.2)
PROT UR STRIP-MCNC: NEGATIVE MG/DL
PROTHROMBIN TIME: 15.5 SEC (ref 11.9–14.1)
RBC # BLD AUTO: 3.38 M/UL (ref 4.1–5.7)
RBC #/AREA URNS HPF: ABNORMAL /HPF (ref 0–5)
SODIUM SERPL-SCNC: 142 MMOL/L (ref 136–145)
SP GR UR REFRACTOMETRY: >1.03 (ref 1–1.03)
UROBILINOGEN UR QL STRIP.AUTO: 0.1 EU/DL (ref 0.1–1)
WBC # BLD AUTO: 12.8 K/UL (ref 4.1–11.1)
WBC URNS QL MICRO: ABNORMAL /HPF (ref 0–4)

## 2025-08-14 PROCEDURE — 86901 BLOOD TYPING SEROLOGIC RH(D): CPT

## 2025-08-14 PROCEDURE — 6360000002 HC RX W HCPCS: Performed by: EMERGENCY MEDICINE

## 2025-08-14 PROCEDURE — 81003 URINALYSIS AUTO W/O SCOPE: CPT

## 2025-08-14 PROCEDURE — 86850 RBC ANTIBODY SCREEN: CPT

## 2025-08-14 PROCEDURE — 86923 COMPATIBILITY TEST ELECTRIC: CPT

## 2025-08-14 PROCEDURE — 6360000004 HC RX CONTRAST MEDICATION: Performed by: EMERGENCY MEDICINE

## 2025-08-14 PROCEDURE — 85025 COMPLETE CBC W/AUTO DIFF WBC: CPT

## 2025-08-14 PROCEDURE — 1100000000 HC RM PRIVATE

## 2025-08-14 PROCEDURE — 36415 COLL VENOUS BLD VENIPUNCTURE: CPT

## 2025-08-14 PROCEDURE — 86900 BLOOD TYPING SEROLOGIC ABO: CPT

## 2025-08-14 PROCEDURE — 87086 URINE CULTURE/COLONY COUNT: CPT

## 2025-08-14 PROCEDURE — 87040 BLOOD CULTURE FOR BACTERIA: CPT

## 2025-08-14 PROCEDURE — 94761 N-INVAS EAR/PLS OXIMETRY MLT: CPT

## 2025-08-14 PROCEDURE — 74174 CTA ABD&PLVS W/CONTRAST: CPT

## 2025-08-14 PROCEDURE — 80053 COMPREHEN METABOLIC PANEL: CPT

## 2025-08-14 PROCEDURE — 85610 PROTHROMBIN TIME: CPT

## 2025-08-14 PROCEDURE — 2500000003 HC RX 250 WO HCPCS: Performed by: FAMILY MEDICINE

## 2025-08-14 PROCEDURE — 2580000003 HC RX 258: Performed by: EMERGENCY MEDICINE

## 2025-08-14 PROCEDURE — 2580000003 HC RX 258: Performed by: FAMILY MEDICINE

## 2025-08-14 PROCEDURE — 99285 EMERGENCY DEPT VISIT HI MDM: CPT

## 2025-08-14 PROCEDURE — 96374 THER/PROPH/DIAG INJ IV PUSH: CPT

## 2025-08-14 PROCEDURE — 85014 HEMATOCRIT: CPT

## 2025-08-14 PROCEDURE — 85018 HEMOGLOBIN: CPT

## 2025-08-14 RX ORDER — TAMSULOSIN HYDROCHLORIDE 0.4 MG/1
0.4 CAPSULE ORAL DAILY
Status: DISCONTINUED | OUTPATIENT
Start: 2025-08-15 | End: 2025-08-20 | Stop reason: HOSPADM

## 2025-08-14 RX ORDER — BUSPIRONE HYDROCHLORIDE 10 MG/1
10 TABLET ORAL 2 TIMES DAILY
Status: DISCONTINUED | OUTPATIENT
Start: 2025-08-14 | End: 2025-08-15

## 2025-08-14 RX ORDER — SODIUM CHLORIDE 9 MG/ML
INJECTION, SOLUTION INTRAVENOUS PRN
Status: DISCONTINUED | OUTPATIENT
Start: 2025-08-14 | End: 2025-08-20 | Stop reason: HOSPADM

## 2025-08-14 RX ORDER — 0.9 % SODIUM CHLORIDE 0.9 %
1000 INTRAVENOUS SOLUTION INTRAVENOUS
Status: DISCONTINUED | OUTPATIENT
Start: 2025-08-14 | End: 2025-08-14

## 2025-08-14 RX ORDER — SODIUM CHLORIDE 9 MG/ML
INJECTION, SOLUTION INTRAVENOUS CONTINUOUS
Status: DISCONTINUED | OUTPATIENT
Start: 2025-08-14 | End: 2025-08-20 | Stop reason: HOSPADM

## 2025-08-14 RX ORDER — ONDANSETRON 4 MG/1
4 TABLET, ORALLY DISINTEGRATING ORAL EVERY 8 HOURS PRN
Status: DISCONTINUED | OUTPATIENT
Start: 2025-08-14 | End: 2025-08-20 | Stop reason: HOSPADM

## 2025-08-14 RX ORDER — ATORVASTATIN CALCIUM 40 MG/1
80 TABLET, FILM COATED ORAL NIGHTLY
Status: DISCONTINUED | OUTPATIENT
Start: 2025-08-14 | End: 2025-08-20 | Stop reason: HOSPADM

## 2025-08-14 RX ORDER — ONDANSETRON 2 MG/ML
4 INJECTION INTRAMUSCULAR; INTRAVENOUS EVERY 6 HOURS PRN
Status: DISCONTINUED | OUTPATIENT
Start: 2025-08-14 | End: 2025-08-20 | Stop reason: HOSPADM

## 2025-08-14 RX ORDER — MAGNESIUM SULFATE IN WATER 40 MG/ML
2000 INJECTION, SOLUTION INTRAVENOUS PRN
Status: DISCONTINUED | OUTPATIENT
Start: 2025-08-14 | End: 2025-08-20 | Stop reason: HOSPADM

## 2025-08-14 RX ORDER — SODIUM CHLORIDE 0.9 % (FLUSH) 0.9 %
5-40 SYRINGE (ML) INJECTION PRN
Status: DISCONTINUED | OUTPATIENT
Start: 2025-08-14 | End: 2025-08-20 | Stop reason: HOSPADM

## 2025-08-14 RX ORDER — IOPAMIDOL 755 MG/ML
100 INJECTION, SOLUTION INTRAVASCULAR
Status: COMPLETED | OUTPATIENT
Start: 2025-08-14 | End: 2025-08-14

## 2025-08-14 RX ORDER — ACETAMINOPHEN 325 MG/1
650 TABLET ORAL EVERY 6 HOURS PRN
Status: DISCONTINUED | OUTPATIENT
Start: 2025-08-14 | End: 2025-08-20 | Stop reason: HOSPADM

## 2025-08-14 RX ORDER — SODIUM CHLORIDE 0.9 % (FLUSH) 0.9 %
5-40 SYRINGE (ML) INJECTION EVERY 12 HOURS SCHEDULED
Status: DISCONTINUED | OUTPATIENT
Start: 2025-08-14 | End: 2025-08-20 | Stop reason: HOSPADM

## 2025-08-14 RX ORDER — 0.9 % SODIUM CHLORIDE 0.9 %
1000 INTRAVENOUS SOLUTION INTRAVENOUS
Status: COMPLETED | OUTPATIENT
Start: 2025-08-14 | End: 2025-08-14

## 2025-08-14 RX ORDER — POTASSIUM CHLORIDE 1500 MG/1
40 TABLET, EXTENDED RELEASE ORAL PRN
Status: DISCONTINUED | OUTPATIENT
Start: 2025-08-14 | End: 2025-08-20 | Stop reason: HOSPADM

## 2025-08-14 RX ORDER — 0.9 % SODIUM CHLORIDE 0.9 %
500 INTRAVENOUS SOLUTION INTRAVENOUS ONCE
Status: COMPLETED | OUTPATIENT
Start: 2025-08-14 | End: 2025-08-14

## 2025-08-14 RX ORDER — AMANTADINE HYDROCHLORIDE 100 MG/1
100 CAPSULE, GELATIN COATED ORAL 2 TIMES DAILY
Status: DISCONTINUED | OUTPATIENT
Start: 2025-08-14 | End: 2025-08-20 | Stop reason: HOSPADM

## 2025-08-14 RX ORDER — ACETAMINOPHEN 650 MG/1
650 SUPPOSITORY RECTAL EVERY 6 HOURS PRN
Status: DISCONTINUED | OUTPATIENT
Start: 2025-08-14 | End: 2025-08-20 | Stop reason: HOSPADM

## 2025-08-14 RX ORDER — POTASSIUM CHLORIDE 7.45 MG/ML
10 INJECTION INTRAVENOUS PRN
Status: DISCONTINUED | OUTPATIENT
Start: 2025-08-14 | End: 2025-08-20 | Stop reason: HOSPADM

## 2025-08-14 RX ORDER — POLYETHYLENE GLYCOL 3350 17 G/17G
17 POWDER, FOR SOLUTION ORAL DAILY PRN
Status: DISCONTINUED | OUTPATIENT
Start: 2025-08-14 | End: 2025-08-20 | Stop reason: HOSPADM

## 2025-08-14 RX ADMIN — IOPAMIDOL 100 ML: 755 INJECTION, SOLUTION INTRAVENOUS at 17:27

## 2025-08-14 RX ADMIN — SODIUM CHLORIDE 1000 ML: 0.9 INJECTION, SOLUTION INTRAVENOUS at 16:12

## 2025-08-14 RX ADMIN — PIPERACILLIN AND TAZOBACTAM 4500 MG: 4; .5 INJECTION, POWDER, LYOPHILIZED, FOR SOLUTION INTRAVENOUS at 20:18

## 2025-08-14 RX ADMIN — SODIUM CHLORIDE, PRESERVATIVE FREE 10 ML: 5 INJECTION INTRAVENOUS at 20:31

## 2025-08-14 RX ADMIN — SODIUM CHLORIDE: 9 INJECTION, SOLUTION INTRAVENOUS at 20:31

## 2025-08-14 RX ADMIN — SODIUM CHLORIDE 500 ML: 9 INJECTION, SOLUTION INTRAVENOUS at 20:31

## 2025-08-14 ASSESSMENT — PAIN SCALES - GENERAL
PAINLEVEL_OUTOF10: 0
PAINLEVEL_OUTOF10: 0

## 2025-08-14 ASSESSMENT — PAIN - FUNCTIONAL ASSESSMENT: PAIN_FUNCTIONAL_ASSESSMENT: 0-10

## 2025-08-15 LAB
APPEARANCE UR: ABNORMAL
BASOPHILS # BLD: 0.03 K/UL (ref 0–0.1)
BASOPHILS # BLD: 0.04 K/UL (ref 0–0.1)
BASOPHILS NFR BLD: 0.5 % (ref 0–1)
BASOPHILS NFR BLD: 0.6 % (ref 0–1)
BILIRUB UR QL: NEGATIVE
COLOR UR: YELLOW
DIFFERENTIAL METHOD BLD: ABNORMAL
DIFFERENTIAL METHOD BLD: ABNORMAL
EOSINOPHIL # BLD: 0.1 K/UL (ref 0–0.4)
EOSINOPHIL # BLD: 0.14 K/UL (ref 0–0.4)
EOSINOPHIL NFR BLD: 1.4 % (ref 0–7)
EOSINOPHIL NFR BLD: 2.4 % (ref 0–7)
ERYTHROCYTE [DISTWIDTH] IN BLOOD BY AUTOMATED COUNT: 13.5 % (ref 11.5–14.5)
ERYTHROCYTE [DISTWIDTH] IN BLOOD BY AUTOMATED COUNT: 13.6 % (ref 11.5–14.5)
GLUCOSE UR STRIP.AUTO-MCNC: NEGATIVE MG/DL
HCT VFR BLD AUTO: 23.1 % (ref 36.6–50.3)
HCT VFR BLD AUTO: 23.6 % (ref 36.6–50.3)
HCT VFR BLD AUTO: 27.1 % (ref 36.6–50.3)
HGB BLD-MCNC: 7.7 G/DL (ref 12.1–17)
HGB BLD-MCNC: 7.8 G/DL (ref 12.1–17)
HGB BLD-MCNC: 8.8 G/DL (ref 12.1–17)
HGB UR QL STRIP: NEGATIVE
IMM GRANULOCYTES # BLD AUTO: 0.03 K/UL (ref 0–0.04)
IMM GRANULOCYTES # BLD AUTO: 0.04 K/UL (ref 0–0.04)
IMM GRANULOCYTES NFR BLD AUTO: 0.5 % (ref 0–0.5)
IMM GRANULOCYTES NFR BLD AUTO: 0.6 % (ref 0–0.5)
KETONES UR QL STRIP.AUTO: NEGATIVE MG/DL
LEUKOCYTE ESTERASE UR QL STRIP.AUTO: NEGATIVE
LYMPHOCYTES # BLD: 1.77 K/UL (ref 0.8–3.5)
LYMPHOCYTES # BLD: 1.83 K/UL (ref 0.8–3.5)
LYMPHOCYTES NFR BLD: 24.7 % (ref 12–49)
LYMPHOCYTES NFR BLD: 31.1 % (ref 12–49)
MCH RBC QN AUTO: 29.4 PG (ref 26–34)
MCH RBC QN AUTO: 30.4 PG (ref 26–34)
MCHC RBC AUTO-ENTMCNC: 32.5 G/DL (ref 30–36.5)
MCHC RBC AUTO-ENTMCNC: 33.8 G/DL (ref 30–36.5)
MCV RBC AUTO: 89.9 FL (ref 80–99)
MCV RBC AUTO: 90.6 FL (ref 80–99)
MONOCYTES # BLD: 0.37 K/UL (ref 0–1)
MONOCYTES # BLD: 0.47 K/UL (ref 0–1)
MONOCYTES NFR BLD: 6.3 % (ref 5–13)
MONOCYTES NFR BLD: 6.5 % (ref 5–13)
NEUTS SEG # BLD: 3.48 K/UL (ref 1.8–8)
NEUTS SEG # BLD: 4.76 K/UL (ref 1.8–8)
NEUTS SEG NFR BLD: 59.2 % (ref 32–75)
NEUTS SEG NFR BLD: 66.2 % (ref 32–75)
NITRITE UR QL STRIP.AUTO: NEGATIVE
NRBC # BLD: 0 K/UL (ref 0–0.01)
NRBC # BLD: 0 K/UL (ref 0–0.01)
NRBC BLD-RTO: 0 PER 100 WBC
NRBC BLD-RTO: 0 PER 100 WBC
PH UR STRIP: 5 (ref 5–8)
PLATELET # BLD AUTO: 264 K/UL (ref 150–400)
PLATELET # BLD AUTO: 310 K/UL (ref 150–400)
PMV BLD AUTO: 9.1 FL (ref 8.9–12.9)
PMV BLD AUTO: 9.8 FL (ref 8.9–12.9)
PROT UR STRIP-MCNC: NEGATIVE MG/DL
RBC # BLD AUTO: 2.57 M/UL (ref 4.1–5.7)
RBC # BLD AUTO: 2.99 M/UL (ref 4.1–5.7)
SP GR UR REFRACTOMETRY: 1.02 (ref 1–1.03)
UROBILINOGEN UR QL STRIP.AUTO: 0.1 EU/DL (ref 0.1–1)
WBC # BLD AUTO: 5.9 K/UL (ref 4.1–11.1)
WBC # BLD AUTO: 7.2 K/UL (ref 4.1–11.1)

## 2025-08-15 PROCEDURE — 1100000000 HC RM PRIVATE

## 2025-08-15 PROCEDURE — 6370000000 HC RX 637 (ALT 250 FOR IP): Performed by: FAMILY MEDICINE

## 2025-08-15 PROCEDURE — 87086 URINE CULTURE/COLONY COUNT: CPT

## 2025-08-15 PROCEDURE — 85025 COMPLETE CBC W/AUTO DIFF WBC: CPT

## 2025-08-15 PROCEDURE — 2580000003 HC RX 258: Performed by: FAMILY MEDICINE

## 2025-08-15 PROCEDURE — 85018 HEMOGLOBIN: CPT

## 2025-08-15 PROCEDURE — 81003 URINALYSIS AUTO W/O SCOPE: CPT

## 2025-08-15 PROCEDURE — 6360000002 HC RX W HCPCS: Performed by: FAMILY MEDICINE

## 2025-08-15 PROCEDURE — 36415 COLL VENOUS BLD VENIPUNCTURE: CPT

## 2025-08-15 PROCEDURE — 85014 HEMATOCRIT: CPT

## 2025-08-15 PROCEDURE — 2500000003 HC RX 250 WO HCPCS: Performed by: FAMILY MEDICINE

## 2025-08-15 RX ORDER — BUSPIRONE HYDROCHLORIDE 5 MG/1
5 TABLET ORAL 3 TIMES DAILY
Status: DISCONTINUED | OUTPATIENT
Start: 2025-08-15 | End: 2025-08-20 | Stop reason: HOSPADM

## 2025-08-15 RX ADMIN — PIPERACILLIN AND TAZOBACTAM 3375 MG: 3; .375 INJECTION, POWDER, LYOPHILIZED, FOR SOLUTION INTRAVENOUS at 06:55

## 2025-08-15 RX ADMIN — BUSPIRONE HYDROCHLORIDE 5 MG: 5 TABLET ORAL at 12:33

## 2025-08-15 RX ADMIN — AMANTADINE HYDROCHLORIDE 100 MG: 100 CAPSULE ORAL at 08:54

## 2025-08-15 RX ADMIN — POTASSIUM BICARBONATE 40 MEQ: 782 TABLET, EFFERVESCENT ORAL at 12:33

## 2025-08-15 RX ADMIN — BUSPIRONE HYDROCHLORIDE 5 MG: 5 TABLET ORAL at 21:13

## 2025-08-15 RX ADMIN — SODIUM CHLORIDE, PRESERVATIVE FREE 10 ML: 5 INJECTION INTRAVENOUS at 09:00

## 2025-08-15 RX ADMIN — PIPERACILLIN AND TAZOBACTAM 3375 MG: 3; .375 INJECTION, POWDER, LYOPHILIZED, FOR SOLUTION INTRAVENOUS at 22:32

## 2025-08-15 RX ADMIN — AMANTADINE HYDROCHLORIDE 100 MG: 100 CAPSULE ORAL at 21:13

## 2025-08-15 RX ADMIN — BUSPIRONE HYDROCHLORIDE 5 MG: 5 TABLET ORAL at 16:02

## 2025-08-15 RX ADMIN — SODIUM CHLORIDE: 9 INJECTION, SOLUTION INTRAVENOUS at 23:20

## 2025-08-15 RX ADMIN — SODIUM CHLORIDE: 9 INJECTION, SOLUTION INTRAVENOUS at 12:39

## 2025-08-15 RX ADMIN — PIPERACILLIN AND TAZOBACTAM 3375 MG: 3; .375 INJECTION, POWDER, LYOPHILIZED, FOR SOLUTION INTRAVENOUS at 16:11

## 2025-08-15 RX ADMIN — ATORVASTATIN CALCIUM 80 MG: 40 TABLET, FILM COATED ORAL at 21:13

## 2025-08-15 RX ADMIN — TAMSULOSIN HYDROCHLORIDE 0.4 MG: 0.4 CAPSULE ORAL at 08:54

## 2025-08-15 RX ADMIN — SODIUM CHLORIDE, PRESERVATIVE FREE 10 ML: 5 INJECTION INTRAVENOUS at 21:14

## 2025-08-15 ASSESSMENT — PAIN SCALES - GENERAL
PAINLEVEL_OUTOF10: 0

## 2025-08-16 LAB
ANION GAP SERPL CALC-SCNC: 7 MMOL/L (ref 2–12)
BACTERIA SPEC CULT: NORMAL
BUN SERPL-MCNC: 9 MG/DL (ref 6–20)
BUN/CREAT SERPL: 12 (ref 12–20)
CA-I BLD-MCNC: 8.4 MG/DL (ref 8.5–10.1)
CHLORIDE SERPL-SCNC: 108 MMOL/L (ref 97–108)
CO2 SERPL-SCNC: 26 MMOL/L (ref 21–32)
CREAT SERPL-MCNC: 0.78 MG/DL (ref 0.7–1.3)
ERYTHROCYTE [DISTWIDTH] IN BLOOD BY AUTOMATED COUNT: 13.5 % (ref 11.5–14.5)
GLUCOSE SERPL-MCNC: 120 MG/DL (ref 65–100)
HCT VFR BLD AUTO: 22 % (ref 36.6–50.3)
HCT VFR BLD AUTO: 23.7 % (ref 36.6–50.3)
HGB BLD-MCNC: 7.4 G/DL (ref 12.1–17)
HGB BLD-MCNC: 7.8 G/DL (ref 12.1–17)
Lab: NORMAL
MAGNESIUM SERPL-MCNC: 2.1 MG/DL (ref 1.6–2.4)
MCH RBC QN AUTO: 30.1 PG (ref 26–34)
MCHC RBC AUTO-ENTMCNC: 32.9 G/DL (ref 30–36.5)
MCV RBC AUTO: 91.5 FL (ref 80–99)
NRBC # BLD: 0 K/UL (ref 0–0.01)
NRBC BLD-RTO: 0 PER 100 WBC
PLATELET # BLD AUTO: 300 K/UL (ref 150–400)
PMV BLD AUTO: 9.2 FL (ref 8.9–12.9)
POTASSIUM SERPL-SCNC: 2.7 MMOL/L (ref 3.5–5.1)
POTASSIUM SERPL-SCNC: 3.6 MMOL/L (ref 3.5–5.1)
RBC # BLD AUTO: 2.59 M/UL (ref 4.1–5.7)
SODIUM SERPL-SCNC: 141 MMOL/L (ref 136–145)
WBC # BLD AUTO: 8.2 K/UL (ref 4.1–11.1)

## 2025-08-16 PROCEDURE — 6370000000 HC RX 637 (ALT 250 FOR IP): Performed by: FAMILY MEDICINE

## 2025-08-16 PROCEDURE — 36415 COLL VENOUS BLD VENIPUNCTURE: CPT

## 2025-08-16 PROCEDURE — 83540 ASSAY OF IRON: CPT

## 2025-08-16 PROCEDURE — 85014 HEMATOCRIT: CPT

## 2025-08-16 PROCEDURE — 2500000003 HC RX 250 WO HCPCS: Performed by: FAMILY MEDICINE

## 2025-08-16 PROCEDURE — 85018 HEMOGLOBIN: CPT

## 2025-08-16 PROCEDURE — 84132 ASSAY OF SERUM POTASSIUM: CPT

## 2025-08-16 PROCEDURE — 6360000002 HC RX W HCPCS: Performed by: FAMILY MEDICINE

## 2025-08-16 PROCEDURE — 85027 COMPLETE CBC AUTOMATED: CPT

## 2025-08-16 PROCEDURE — 1100000000 HC RM PRIVATE

## 2025-08-16 PROCEDURE — 83550 IRON BINDING TEST: CPT

## 2025-08-16 PROCEDURE — 82728 ASSAY OF FERRITIN: CPT

## 2025-08-16 PROCEDURE — 80048 BASIC METABOLIC PNL TOTAL CA: CPT

## 2025-08-16 PROCEDURE — 2580000003 HC RX 258: Performed by: FAMILY MEDICINE

## 2025-08-16 PROCEDURE — 83735 ASSAY OF MAGNESIUM: CPT

## 2025-08-16 RX ADMIN — TAMSULOSIN HYDROCHLORIDE 0.4 MG: 0.4 CAPSULE ORAL at 10:13

## 2025-08-16 RX ADMIN — PIPERACILLIN AND TAZOBACTAM 3375 MG: 3; .375 INJECTION, POWDER, LYOPHILIZED, FOR SOLUTION INTRAVENOUS at 15:15

## 2025-08-16 RX ADMIN — POTASSIUM CHLORIDE 10 MEQ: 7.46 INJECTION, SOLUTION INTRAVENOUS at 11:23

## 2025-08-16 RX ADMIN — BUSPIRONE HYDROCHLORIDE 5 MG: 5 TABLET ORAL at 10:12

## 2025-08-16 RX ADMIN — SODIUM CHLORIDE, PRESERVATIVE FREE 10 ML: 5 INJECTION INTRAVENOUS at 20:28

## 2025-08-16 RX ADMIN — ATORVASTATIN CALCIUM 80 MG: 40 TABLET, FILM COATED ORAL at 20:28

## 2025-08-16 RX ADMIN — POTASSIUM CHLORIDE 10 MEQ: 7.46 INJECTION, SOLUTION INTRAVENOUS at 15:12

## 2025-08-16 RX ADMIN — PIPERACILLIN AND TAZOBACTAM 3375 MG: 3; .375 INJECTION, POWDER, LYOPHILIZED, FOR SOLUTION INTRAVENOUS at 05:58

## 2025-08-16 RX ADMIN — ACETAMINOPHEN 650 MG: 325 TABLET ORAL at 10:31

## 2025-08-16 RX ADMIN — AMANTADINE HYDROCHLORIDE 100 MG: 100 CAPSULE ORAL at 10:12

## 2025-08-16 RX ADMIN — BUSPIRONE HYDROCHLORIDE 5 MG: 5 TABLET ORAL at 20:28

## 2025-08-16 RX ADMIN — AMANTADINE HYDROCHLORIDE 100 MG: 100 CAPSULE ORAL at 20:28

## 2025-08-16 RX ADMIN — SODIUM CHLORIDE: 9 INJECTION, SOLUTION INTRAVENOUS at 22:12

## 2025-08-16 RX ADMIN — POTASSIUM CHLORIDE 10 MEQ: 7.46 INJECTION, SOLUTION INTRAVENOUS at 17:07

## 2025-08-16 RX ADMIN — POTASSIUM CHLORIDE 10 MEQ: 7.46 INJECTION, SOLUTION INTRAVENOUS at 18:13

## 2025-08-16 RX ADMIN — SODIUM CHLORIDE, PRESERVATIVE FREE 10 ML: 5 INJECTION INTRAVENOUS at 10:14

## 2025-08-16 RX ADMIN — BUSPIRONE HYDROCHLORIDE 5 MG: 5 TABLET ORAL at 13:53

## 2025-08-16 RX ADMIN — POTASSIUM CHLORIDE 10 MEQ: 7.46 INJECTION, SOLUTION INTRAVENOUS at 12:38

## 2025-08-16 RX ADMIN — POTASSIUM CHLORIDE 10 MEQ: 7.46 INJECTION, SOLUTION INTRAVENOUS at 13:54

## 2025-08-16 RX ADMIN — PIPERACILLIN AND TAZOBACTAM 3375 MG: 3; .375 INJECTION, POWDER, LYOPHILIZED, FOR SOLUTION INTRAVENOUS at 22:10

## 2025-08-16 ASSESSMENT — PAIN - FUNCTIONAL ASSESSMENT
PAIN_FUNCTIONAL_ASSESSMENT: 0-10
PAIN_FUNCTIONAL_ASSESSMENT: 0-10

## 2025-08-16 ASSESSMENT — PAIN SCALES - GENERAL
PAINLEVEL_OUTOF10: 3
PAINLEVEL_OUTOF10: 0

## 2025-08-16 ASSESSMENT — PAIN DESCRIPTION - DESCRIPTORS: DESCRIPTORS: ACHING

## 2025-08-16 ASSESSMENT — PAIN DESCRIPTION - ORIENTATION: ORIENTATION: LEFT

## 2025-08-16 ASSESSMENT — PAIN DESCRIPTION - LOCATION: LOCATION: HAND

## 2025-08-17 LAB
ANION GAP SERPL CALC-SCNC: 5 MMOL/L (ref 2–12)
BASOPHILS # BLD: 0.03 K/UL (ref 0–0.1)
BASOPHILS NFR BLD: 0.4 % (ref 0–1)
BUN SERPL-MCNC: 7 MG/DL (ref 6–20)
BUN/CREAT SERPL: 10 (ref 12–20)
CA-I BLD-MCNC: 8.6 MG/DL (ref 8.5–10.1)
CHLORIDE SERPL-SCNC: 111 MMOL/L (ref 97–108)
CO2 SERPL-SCNC: 26 MMOL/L (ref 21–32)
CREAT SERPL-MCNC: 0.72 MG/DL (ref 0.7–1.3)
DIFFERENTIAL METHOD BLD: ABNORMAL
EOSINOPHIL # BLD: 0.27 K/UL (ref 0–0.4)
EOSINOPHIL NFR BLD: 3.8 % (ref 0–7)
ERYTHROCYTE [DISTWIDTH] IN BLOOD BY AUTOMATED COUNT: 13.6 % (ref 11.5–14.5)
FERRITIN SERPL-MCNC: 412 NG/ML (ref 30–400)
GLUCOSE SERPL-MCNC: 102 MG/DL (ref 65–100)
HCT VFR BLD AUTO: 23.6 % (ref 36.6–50.3)
HGB BLD-MCNC: 7.9 G/DL (ref 12.1–17)
IMM GRANULOCYTES # BLD AUTO: 0.08 K/UL (ref 0–0.04)
IMM GRANULOCYTES NFR BLD AUTO: 1.1 % (ref 0–0.5)
IRON SATN MFR SERPL: 21 %
IRON SERPL-MCNC: 32 UG/DL (ref 40–157)
LYMPHOCYTES # BLD: 2.12 K/UL (ref 0.8–3.5)
LYMPHOCYTES NFR BLD: 30.1 % (ref 12–49)
MCH RBC QN AUTO: 29.7 PG (ref 26–34)
MCHC RBC AUTO-ENTMCNC: 33.5 G/DL (ref 30–36.5)
MCV RBC AUTO: 88.7 FL (ref 80–99)
MONOCYTES # BLD: 0.4 K/UL (ref 0–1)
MONOCYTES NFR BLD: 5.7 % (ref 5–13)
NEUTS SEG # BLD: 4.15 K/UL (ref 1.8–8)
NEUTS SEG NFR BLD: 58.9 % (ref 32–75)
NRBC # BLD: 0 K/UL (ref 0–0.01)
NRBC BLD-RTO: 0 PER 100 WBC
PLATELET # BLD AUTO: 292 K/UL (ref 150–400)
PMV BLD AUTO: 8.9 FL (ref 8.9–12.9)
POTASSIUM SERPL-SCNC: 3.4 MMOL/L (ref 3.5–5.1)
RBC # BLD AUTO: 2.66 M/UL (ref 4.1–5.7)
SODIUM SERPL-SCNC: 142 MMOL/L (ref 136–145)
TIBC SERPL-MCNC: 152 UG/DL (ref 250–450)
UIBC SERPL-MCNC: 120 UG/DL (ref 112–347)
WBC # BLD AUTO: 7.1 K/UL (ref 4.1–11.1)

## 2025-08-17 PROCEDURE — 2500000003 HC RX 250 WO HCPCS: Performed by: FAMILY MEDICINE

## 2025-08-17 PROCEDURE — 93005 ELECTROCARDIOGRAM TRACING: CPT

## 2025-08-17 PROCEDURE — 6370000000 HC RX 637 (ALT 250 FOR IP): Performed by: FAMILY MEDICINE

## 2025-08-17 PROCEDURE — 97530 THERAPEUTIC ACTIVITIES: CPT

## 2025-08-17 PROCEDURE — 36415 COLL VENOUS BLD VENIPUNCTURE: CPT

## 2025-08-17 PROCEDURE — 1100000000 HC RM PRIVATE

## 2025-08-17 PROCEDURE — 97161 PT EVAL LOW COMPLEX 20 MIN: CPT

## 2025-08-17 PROCEDURE — 80048 BASIC METABOLIC PNL TOTAL CA: CPT

## 2025-08-17 PROCEDURE — 2580000003 HC RX 258: Performed by: FAMILY MEDICINE

## 2025-08-17 PROCEDURE — 6360000002 HC RX W HCPCS: Performed by: FAMILY MEDICINE

## 2025-08-17 PROCEDURE — 85025 COMPLETE CBC W/AUTO DIFF WBC: CPT

## 2025-08-17 RX ADMIN — PIPERACILLIN AND TAZOBACTAM 3375 MG: 3; .375 INJECTION, POWDER, LYOPHILIZED, FOR SOLUTION INTRAVENOUS at 22:38

## 2025-08-17 RX ADMIN — AMANTADINE HYDROCHLORIDE 100 MG: 100 CAPSULE ORAL at 22:29

## 2025-08-17 RX ADMIN — SODIUM CHLORIDE, PRESERVATIVE FREE 10 ML: 5 INJECTION INTRAVENOUS at 22:30

## 2025-08-17 RX ADMIN — PIPERACILLIN AND TAZOBACTAM 3375 MG: 3; .375 INJECTION, POWDER, LYOPHILIZED, FOR SOLUTION INTRAVENOUS at 06:55

## 2025-08-17 RX ADMIN — ATORVASTATIN CALCIUM 80 MG: 40 TABLET, FILM COATED ORAL at 22:30

## 2025-08-17 RX ADMIN — PIPERACILLIN AND TAZOBACTAM 3375 MG: 3; .375 INJECTION, POWDER, LYOPHILIZED, FOR SOLUTION INTRAVENOUS at 16:20

## 2025-08-17 RX ADMIN — BUSPIRONE HYDROCHLORIDE 5 MG: 5 TABLET ORAL at 09:11

## 2025-08-17 RX ADMIN — AMANTADINE HYDROCHLORIDE 100 MG: 100 CAPSULE ORAL at 09:11

## 2025-08-17 RX ADMIN — POTASSIUM BICARBONATE 40 MEQ: 782 TABLET, EFFERVESCENT ORAL at 06:44

## 2025-08-17 RX ADMIN — BUSPIRONE HYDROCHLORIDE 5 MG: 5 TABLET ORAL at 22:30

## 2025-08-17 RX ADMIN — BUSPIRONE HYDROCHLORIDE 5 MG: 5 TABLET ORAL at 16:17

## 2025-08-17 RX ADMIN — TAMSULOSIN HYDROCHLORIDE 0.4 MG: 0.4 CAPSULE ORAL at 09:11

## 2025-08-17 RX ADMIN — SODIUM CHLORIDE: 9 INJECTION, SOLUTION INTRAVENOUS at 22:38

## 2025-08-17 ASSESSMENT — PAIN SCALES - GENERAL
PAINLEVEL_OUTOF10: 0

## 2025-08-18 ENCOUNTER — HOSPITAL ENCOUNTER (INPATIENT)
Facility: HOSPITAL | Age: 85
Discharge: HOME | DRG: 391 | End: 2025-08-20
Attending: INTERNAL MEDICINE
Payer: MEDICARE

## 2025-08-18 LAB
ABO + RH BLD: NORMAL
ANION GAP SERPL CALC-SCNC: 7 MMOL/L (ref 2–12)
BASOPHILS # BLD: 0.05 K/UL (ref 0–0.1)
BASOPHILS NFR BLD: 0.7 % (ref 0–1)
BLD PROD TYP BPU: NORMAL
BLD PROD TYP BPU: NORMAL
BLOOD BANK DISPENSE STATUS: NORMAL
BLOOD BANK DISPENSE STATUS: NORMAL
BLOOD GROUP ANTIBODIES SERPL: NEGATIVE
BPU ID: NORMAL
BPU ID: NORMAL
BUN SERPL-MCNC: 7 MG/DL (ref 6–20)
BUN/CREAT SERPL: 9 (ref 12–20)
CA-I BLD-MCNC: 8.9 MG/DL (ref 8.5–10.1)
CHLORIDE SERPL-SCNC: 107 MMOL/L (ref 97–108)
CO2 SERPL-SCNC: 26 MMOL/L (ref 21–32)
CREAT SERPL-MCNC: 0.82 MG/DL (ref 0.7–1.3)
CROSSMATCH RESULT: NORMAL
CROSSMATCH RESULT: NORMAL
DIFFERENTIAL METHOD BLD: ABNORMAL
EKG ATRIAL RATE: 96 BPM
EKG DIAGNOSIS: NORMAL
EKG P AXIS: 74 DEGREES
EKG P-R INTERVAL: 140 MS
EKG Q-T INTERVAL: 392 MS
EKG QRS DURATION: 128 MS
EKG QTC CALCULATION (BAZETT): 495 MS
EKG R AXIS: -52 DEGREES
EKG T AXIS: 24 DEGREES
EKG VENTRICULAR RATE: 96 BPM
EOSINOPHIL # BLD: 0.19 K/UL (ref 0–0.4)
EOSINOPHIL NFR BLD: 2.8 % (ref 0–7)
ERYTHROCYTE [DISTWIDTH] IN BLOOD BY AUTOMATED COUNT: 13.7 % (ref 11.5–14.5)
GLUCOSE SERPL-MCNC: 102 MG/DL (ref 65–100)
HCT VFR BLD AUTO: 24.8 % (ref 36.6–50.3)
HGB BLD-MCNC: 8.2 G/DL (ref 12.1–17)
IMM GRANULOCYTES # BLD AUTO: 0.08 K/UL (ref 0–0.04)
IMM GRANULOCYTES NFR BLD AUTO: 1.2 % (ref 0–0.5)
LYMPHOCYTES # BLD: 2.05 K/UL (ref 0.8–3.5)
LYMPHOCYTES NFR BLD: 29.9 % (ref 12–49)
MAGNESIUM SERPL-MCNC: 2.3 MG/DL (ref 1.6–2.4)
MCH RBC QN AUTO: 29.7 PG (ref 26–34)
MCHC RBC AUTO-ENTMCNC: 33.1 G/DL (ref 30–36.5)
MCV RBC AUTO: 89.9 FL (ref 80–99)
MONOCYTES # BLD: 0.38 K/UL (ref 0–1)
MONOCYTES NFR BLD: 5.5 % (ref 5–13)
NEUTS SEG # BLD: 4.1 K/UL (ref 1.8–8)
NEUTS SEG NFR BLD: 59.9 % (ref 32–75)
NRBC # BLD: 0 K/UL (ref 0–0.01)
NRBC BLD-RTO: 0 PER 100 WBC
PLATELET # BLD AUTO: 349 K/UL (ref 150–400)
PMV BLD AUTO: 8.6 FL (ref 8.9–12.9)
POTASSIUM SERPL-SCNC: 3.1 MMOL/L (ref 3.5–5.1)
RBC # BLD AUTO: 2.76 M/UL (ref 4.1–5.7)
SODIUM SERPL-SCNC: 140 MMOL/L (ref 136–145)
SPECIMEN EXP DATE BLD: NORMAL
TRANSFUSION STATUS PATIENT QL: NORMAL
TRANSFUSION STATUS PATIENT QL: NORMAL
UNIT DIVISION: 0
UNIT DIVISION: 0
WBC # BLD AUTO: 6.9 K/UL (ref 4.1–11.1)

## 2025-08-18 PROCEDURE — 80048 BASIC METABOLIC PNL TOTAL CA: CPT

## 2025-08-18 PROCEDURE — 93005 ELECTROCARDIOGRAM TRACING: CPT | Performed by: INTERNAL MEDICINE

## 2025-08-18 PROCEDURE — 2580000003 HC RX 258: Performed by: FAMILY MEDICINE

## 2025-08-18 PROCEDURE — 6360000002 HC RX W HCPCS: Performed by: FAMILY MEDICINE

## 2025-08-18 PROCEDURE — 85025 COMPLETE CBC W/AUTO DIFF WBC: CPT

## 2025-08-18 PROCEDURE — 6370000000 HC RX 637 (ALT 250 FOR IP)

## 2025-08-18 PROCEDURE — 94761 N-INVAS EAR/PLS OXIMETRY MLT: CPT

## 2025-08-18 PROCEDURE — 83735 ASSAY OF MAGNESIUM: CPT

## 2025-08-18 PROCEDURE — 6370000000 HC RX 637 (ALT 250 FOR IP): Performed by: FAMILY MEDICINE

## 2025-08-18 PROCEDURE — 36415 COLL VENOUS BLD VENIPUNCTURE: CPT

## 2025-08-18 PROCEDURE — 93306 TTE W/DOPPLER COMPLETE: CPT

## 2025-08-18 PROCEDURE — 1100000000 HC RM PRIVATE

## 2025-08-18 PROCEDURE — 2500000003 HC RX 250 WO HCPCS: Performed by: FAMILY MEDICINE

## 2025-08-18 RX ORDER — FERROUS SULFATE 325(65) MG
325 TABLET ORAL
Status: DISCONTINUED | OUTPATIENT
Start: 2025-08-19 | End: 2025-08-20 | Stop reason: HOSPADM

## 2025-08-18 RX ORDER — MECOBALAMIN 5000 MCG
5 TABLET,DISINTEGRATING ORAL NIGHTLY
Status: DISCONTINUED | OUTPATIENT
Start: 2025-08-18 | End: 2025-08-20 | Stop reason: HOSPADM

## 2025-08-18 RX ORDER — DOCUSATE SODIUM 100 MG/1
100 CAPSULE, LIQUID FILLED ORAL DAILY
Status: DISCONTINUED | OUTPATIENT
Start: 2025-08-18 | End: 2025-08-20 | Stop reason: HOSPADM

## 2025-08-18 RX ADMIN — DOCUSATE SODIUM 100 MG: 100 CAPSULE, LIQUID FILLED ORAL at 15:36

## 2025-08-18 RX ADMIN — BUSPIRONE HYDROCHLORIDE 5 MG: 5 TABLET ORAL at 15:36

## 2025-08-18 RX ADMIN — BUSPIRONE HYDROCHLORIDE 5 MG: 5 TABLET ORAL at 09:08

## 2025-08-18 RX ADMIN — PIPERACILLIN AND TAZOBACTAM 3375 MG: 3; .375 INJECTION, POWDER, LYOPHILIZED, FOR SOLUTION INTRAVENOUS at 23:08

## 2025-08-18 RX ADMIN — POTASSIUM CHLORIDE 40 MEQ: 1500 TABLET, EXTENDED RELEASE ORAL at 15:36

## 2025-08-18 RX ADMIN — ACETAMINOPHEN 650 MG: 325 TABLET ORAL at 09:07

## 2025-08-18 RX ADMIN — SODIUM CHLORIDE, PRESERVATIVE FREE 10 ML: 5 INJECTION INTRAVENOUS at 09:09

## 2025-08-18 RX ADMIN — PIPERACILLIN AND TAZOBACTAM 3375 MG: 3; .375 INJECTION, POWDER, LYOPHILIZED, FOR SOLUTION INTRAVENOUS at 05:55

## 2025-08-18 RX ADMIN — TAMSULOSIN HYDROCHLORIDE 0.4 MG: 0.4 CAPSULE ORAL at 09:08

## 2025-08-18 RX ADMIN — Medication 5 MG: at 20:24

## 2025-08-18 RX ADMIN — PIPERACILLIN AND TAZOBACTAM 3375 MG: 3; .375 INJECTION, POWDER, LYOPHILIZED, FOR SOLUTION INTRAVENOUS at 15:41

## 2025-08-18 RX ADMIN — BUSPIRONE HYDROCHLORIDE 5 MG: 5 TABLET ORAL at 20:24

## 2025-08-18 RX ADMIN — AMANTADINE HYDROCHLORIDE 100 MG: 100 CAPSULE ORAL at 20:24

## 2025-08-18 RX ADMIN — ACETAMINOPHEN 650 MG: 325 TABLET ORAL at 00:46

## 2025-08-18 RX ADMIN — AMANTADINE HYDROCHLORIDE 100 MG: 100 CAPSULE ORAL at 09:08

## 2025-08-18 RX ADMIN — POLYETHYLENE GLYCOL 3350 17 G: 17 POWDER, FOR SOLUTION ORAL at 15:36

## 2025-08-18 RX ADMIN — ATORVASTATIN CALCIUM 80 MG: 40 TABLET, FILM COATED ORAL at 20:24

## 2025-08-18 ASSESSMENT — PAIN SCALES - GENERAL
PAINLEVEL_OUTOF10: 0
PAINLEVEL_OUTOF10: 0
PAINLEVEL_OUTOF10: 6
PAINLEVEL_OUTOF10: 4
PAINLEVEL_OUTOF10: 0
PAINLEVEL_OUTOF10: 3

## 2025-08-18 ASSESSMENT — PAIN - FUNCTIONAL ASSESSMENT
PAIN_FUNCTIONAL_ASSESSMENT: 0-10

## 2025-08-18 ASSESSMENT — PAIN DESCRIPTION - LOCATION
LOCATION: HAND
LOCATION: LEG

## 2025-08-18 ASSESSMENT — PAIN DESCRIPTION - DESCRIPTORS
DESCRIPTORS: DISCOMFORT
DESCRIPTORS: ACHING

## 2025-08-18 ASSESSMENT — PAIN DESCRIPTION - ORIENTATION
ORIENTATION: LEFT
ORIENTATION: LEFT

## 2025-08-19 LAB
ANION GAP SERPL CALC-SCNC: 4 MMOL/L (ref 2–12)
BASOPHILS # BLD: 0.04 K/UL (ref 0–0.1)
BASOPHILS NFR BLD: 0.6 % (ref 0–1)
BUN SERPL-MCNC: 8 MG/DL (ref 6–20)
BUN/CREAT SERPL: 11 (ref 12–20)
CA-I BLD-MCNC: 9 MG/DL (ref 8.5–10.1)
CHLORIDE SERPL-SCNC: 111 MMOL/L (ref 97–108)
CO2 SERPL-SCNC: 27 MMOL/L (ref 21–32)
CREAT SERPL-MCNC: 0.76 MG/DL (ref 0.7–1.3)
DIFFERENTIAL METHOD BLD: ABNORMAL
ECHO BSA: 1.94 M2
ECHO LV E' LATERAL VELOCITY: 8.7 CM/S
ECHO LV E' SEPTAL VELOCITY: 8.7 CM/S
ECHO LV EF PHYSICIAN: 55 %
ECHO MV A VELOCITY: 1.15 M/S
ECHO MV E DECELERATION TIME (DT): 234 MS
ECHO MV E VELOCITY: 0.95 M/S
ECHO MV E/A RATIO: 0.83
ECHO MV E/E' LATERAL: 10.92
ECHO MV E/E' RATIO (AVERAGED): 10.92
ECHO MV E/E' SEPTAL: 10.92
ECHO RV FREE WALL PEAK S': 12.5 CM/S
ECHO RV TAPSE: 2.1 CM (ref 1.7–?)
EKG ATRIAL RATE: 94 BPM
EKG DIAGNOSIS: NORMAL
EKG P AXIS: 63 DEGREES
EKG P-R INTERVAL: 148 MS
EKG Q-T INTERVAL: 388 MS
EKG QRS DURATION: 130 MS
EKG QTC CALCULATION (BAZETT): 485 MS
EKG R AXIS: -52 DEGREES
EKG T AXIS: 20 DEGREES
EKG VENTRICULAR RATE: 94 BPM
EOSINOPHIL # BLD: 0.22 K/UL (ref 0–0.4)
EOSINOPHIL NFR BLD: 3.2 % (ref 0–7)
ERYTHROCYTE [DISTWIDTH] IN BLOOD BY AUTOMATED COUNT: 14 % (ref 11.5–14.5)
GLUCOSE SERPL-MCNC: 96 MG/DL (ref 65–100)
HCT VFR BLD AUTO: 26.2 % (ref 36.6–50.3)
HGB BLD-MCNC: 8.4 G/DL (ref 12.1–17)
IMM GRANULOCYTES # BLD AUTO: 0.05 K/UL (ref 0–0.04)
IMM GRANULOCYTES NFR BLD AUTO: 0.7 % (ref 0–0.5)
LYMPHOCYTES # BLD: 2.22 K/UL (ref 0.8–3.5)
LYMPHOCYTES NFR BLD: 32.6 % (ref 12–49)
MCH RBC QN AUTO: 29.7 PG (ref 26–34)
MCHC RBC AUTO-ENTMCNC: 32.1 G/DL (ref 30–36.5)
MCV RBC AUTO: 92.6 FL (ref 80–99)
MONOCYTES # BLD: 0.31 K/UL (ref 0–1)
MONOCYTES NFR BLD: 4.5 % (ref 5–13)
NEUTS SEG # BLD: 3.98 K/UL (ref 1.8–8)
NEUTS SEG NFR BLD: 58.4 % (ref 32–75)
NRBC # BLD: 0 K/UL (ref 0–0.01)
NRBC BLD-RTO: 0 PER 100 WBC
PLATELET # BLD AUTO: 400 K/UL (ref 150–400)
PMV BLD AUTO: 9 FL (ref 8.9–12.9)
POTASSIUM SERPL-SCNC: 3.3 MMOL/L (ref 3.5–5.1)
RBC # BLD AUTO: 2.83 M/UL (ref 4.1–5.7)
SODIUM SERPL-SCNC: 142 MMOL/L (ref 136–145)
WBC # BLD AUTO: 6.8 K/UL (ref 4.1–11.1)

## 2025-08-19 PROCEDURE — 6370000000 HC RX 637 (ALT 250 FOR IP)

## 2025-08-19 PROCEDURE — 6370000000 HC RX 637 (ALT 250 FOR IP): Performed by: FAMILY MEDICINE

## 2025-08-19 PROCEDURE — 6370000000 HC RX 637 (ALT 250 FOR IP): Performed by: INTERNAL MEDICINE

## 2025-08-19 PROCEDURE — 85025 COMPLETE CBC W/AUTO DIFF WBC: CPT

## 2025-08-19 PROCEDURE — 1100000000 HC RM PRIVATE

## 2025-08-19 PROCEDURE — 6360000002 HC RX W HCPCS: Performed by: FAMILY MEDICINE

## 2025-08-19 PROCEDURE — 2500000003 HC RX 250 WO HCPCS: Performed by: FAMILY MEDICINE

## 2025-08-19 PROCEDURE — 36415 COLL VENOUS BLD VENIPUNCTURE: CPT

## 2025-08-19 PROCEDURE — 2580000003 HC RX 258

## 2025-08-19 PROCEDURE — 2580000003 HC RX 258: Performed by: FAMILY MEDICINE

## 2025-08-19 PROCEDURE — 80048 BASIC METABOLIC PNL TOTAL CA: CPT

## 2025-08-19 RX ORDER — POTASSIUM CHLORIDE 1500 MG/1
40 TABLET, EXTENDED RELEASE ORAL EVERY 4 HOURS
Status: COMPLETED | OUTPATIENT
Start: 2025-08-19 | End: 2025-08-19

## 2025-08-19 RX ADMIN — POTASSIUM CHLORIDE 40 MEQ: 1500 TABLET, EXTENDED RELEASE ORAL at 18:45

## 2025-08-19 RX ADMIN — AMANTADINE HYDROCHLORIDE 100 MG: 100 CAPSULE ORAL at 22:26

## 2025-08-19 RX ADMIN — BUSPIRONE HYDROCHLORIDE 5 MG: 5 TABLET ORAL at 22:26

## 2025-08-19 RX ADMIN — Medication 5 MG: at 22:26

## 2025-08-19 RX ADMIN — AMANTADINE HYDROCHLORIDE 100 MG: 100 CAPSULE ORAL at 10:04

## 2025-08-19 RX ADMIN — GLYCERIN 2 G: 2 SUPPOSITORY RECTAL at 11:44

## 2025-08-19 RX ADMIN — TAMSULOSIN HYDROCHLORIDE 0.4 MG: 0.4 CAPSULE ORAL at 10:04

## 2025-08-19 RX ADMIN — SODIUM CHLORIDE: 9 INJECTION, SOLUTION INTRAVENOUS at 22:31

## 2025-08-19 RX ADMIN — SODIUM CHLORIDE, PRESERVATIVE FREE 10 ML: 5 INJECTION INTRAVENOUS at 09:00

## 2025-08-19 RX ADMIN — FERROUS SULFATE TAB 325 MG (65 MG ELEMENTAL FE) 325 MG: 325 (65 FE) TAB at 10:04

## 2025-08-19 RX ADMIN — BUSPIRONE HYDROCHLORIDE 5 MG: 5 TABLET ORAL at 10:04

## 2025-08-19 RX ADMIN — PIPERACILLIN AND TAZOBACTAM 3375 MG: 3; .375 INJECTION, POWDER, LYOPHILIZED, FOR SOLUTION INTRAVENOUS at 06:59

## 2025-08-19 RX ADMIN — PIPERACILLIN AND TAZOBACTAM 3375 MG: 3; .375 INJECTION, POWDER, LYOPHILIZED, FOR SOLUTION INTRAVENOUS at 22:33

## 2025-08-19 RX ADMIN — PIPERACILLIN AND TAZOBACTAM 3375 MG: 3; .375 INJECTION, POWDER, LYOPHILIZED, FOR SOLUTION INTRAVENOUS at 15:55

## 2025-08-19 RX ADMIN — ACETAMINOPHEN 650 MG: 325 TABLET ORAL at 15:53

## 2025-08-19 RX ADMIN — ATORVASTATIN CALCIUM 80 MG: 40 TABLET, FILM COATED ORAL at 22:26

## 2025-08-19 RX ADMIN — DOCUSATE SODIUM 100 MG: 100 CAPSULE, LIQUID FILLED ORAL at 10:04

## 2025-08-19 RX ADMIN — POTASSIUM CHLORIDE 40 MEQ: 1500 TABLET, EXTENDED RELEASE ORAL at 10:04

## 2025-08-19 RX ADMIN — POTASSIUM CHLORIDE 40 MEQ: 1500 TABLET, EXTENDED RELEASE ORAL at 14:30

## 2025-08-19 RX ADMIN — BUSPIRONE HYDROCHLORIDE 5 MG: 5 TABLET ORAL at 14:30

## 2025-08-19 ASSESSMENT — PAIN DESCRIPTION - DESCRIPTORS: DESCRIPTORS: ACHING

## 2025-08-19 ASSESSMENT — PAIN SCALES - GENERAL
PAINLEVEL_OUTOF10: 0
PAINLEVEL_OUTOF10: 6

## 2025-08-19 ASSESSMENT — PAIN DESCRIPTION - ORIENTATION: ORIENTATION: LEFT

## 2025-08-19 ASSESSMENT — PAIN - FUNCTIONAL ASSESSMENT: PAIN_FUNCTIONAL_ASSESSMENT: 0-10

## 2025-08-19 ASSESSMENT — PAIN DESCRIPTION - LOCATION: LOCATION: LEG

## 2025-08-20 VITALS
HEIGHT: 69 IN | RESPIRATION RATE: 18 BRPM | OXYGEN SATURATION: 96 % | TEMPERATURE: 98.1 F | DIASTOLIC BLOOD PRESSURE: 74 MMHG | SYSTOLIC BLOOD PRESSURE: 132 MMHG | BODY MASS INDEX: 23.61 KG/M2 | HEART RATE: 88 BPM | WEIGHT: 159.39 LBS

## 2025-08-20 LAB
ALBUMIN SERPL-MCNC: 2.7 G/DL (ref 3.5–5)
ANION GAP SERPL CALC-SCNC: 6 MMOL/L (ref 2–12)
BACTERIA SPEC CULT: NORMAL
BACTERIA SPEC CULT: NORMAL
BASOPHILS # BLD: 0.05 K/UL (ref 0–0.1)
BASOPHILS NFR BLD: 0.4 % (ref 0–1)
BUN SERPL-MCNC: 11 MG/DL (ref 6–20)
BUN/CREAT SERPL: 14 (ref 12–20)
CA-I BLD-MCNC: 8.7 MG/DL (ref 8.5–10.1)
CHLORIDE SERPL-SCNC: 113 MMOL/L (ref 97–108)
CO2 SERPL-SCNC: 23 MMOL/L (ref 21–32)
CREAT SERPL-MCNC: 0.77 MG/DL (ref 0.7–1.3)
DIFFERENTIAL METHOD BLD: ABNORMAL
EOSINOPHIL # BLD: 0.23 K/UL (ref 0–0.4)
EOSINOPHIL NFR BLD: 2 % (ref 0–7)
ERYTHROCYTE [DISTWIDTH] IN BLOOD BY AUTOMATED COUNT: 14.6 % (ref 11.5–14.5)
GLUCOSE SERPL-MCNC: 99 MG/DL (ref 65–100)
HCT VFR BLD AUTO: 25.4 % (ref 36.6–50.3)
HGB BLD-MCNC: 8.3 G/DL (ref 12.1–17)
IMM GRANULOCYTES # BLD AUTO: 0.04 K/UL (ref 0–0.04)
IMM GRANULOCYTES NFR BLD AUTO: 0.4 % (ref 0–0.5)
LYMPHOCYTES # BLD: 2.18 K/UL (ref 0.8–3.5)
LYMPHOCYTES NFR BLD: 19.2 % (ref 12–49)
Lab: NORMAL
Lab: NORMAL
MCH RBC QN AUTO: 30.3 PG (ref 26–34)
MCHC RBC AUTO-ENTMCNC: 32.7 G/DL (ref 30–36.5)
MCV RBC AUTO: 92.7 FL (ref 80–99)
MONOCYTES # BLD: 0.43 K/UL (ref 0–1)
MONOCYTES NFR BLD: 3.8 % (ref 5–13)
NEUTS SEG # BLD: 8.41 K/UL (ref 1.8–8)
NEUTS SEG NFR BLD: 74.2 % (ref 32–75)
NRBC # BLD: 0 K/UL (ref 0–0.01)
NRBC BLD-RTO: 0 PER 100 WBC
PHOSPHATE SERPL-MCNC: 2.8 MG/DL (ref 2.6–4.7)
PLATELET # BLD AUTO: 404 K/UL (ref 150–400)
PMV BLD AUTO: 8.7 FL (ref 8.9–12.9)
POTASSIUM SERPL-SCNC: 4 MMOL/L (ref 3.5–5.1)
RBC # BLD AUTO: 2.74 M/UL (ref 4.1–5.7)
SODIUM SERPL-SCNC: 142 MMOL/L (ref 136–145)
WBC # BLD AUTO: 11.3 K/UL (ref 4.1–11.1)

## 2025-08-20 PROCEDURE — 2500000003 HC RX 250 WO HCPCS: Performed by: FAMILY MEDICINE

## 2025-08-20 PROCEDURE — 80069 RENAL FUNCTION PANEL: CPT

## 2025-08-20 PROCEDURE — 6370000000 HC RX 637 (ALT 250 FOR IP): Performed by: FAMILY MEDICINE

## 2025-08-20 PROCEDURE — 6360000002 HC RX W HCPCS: Performed by: FAMILY MEDICINE

## 2025-08-20 PROCEDURE — 85025 COMPLETE CBC W/AUTO DIFF WBC: CPT

## 2025-08-20 PROCEDURE — 36415 COLL VENOUS BLD VENIPUNCTURE: CPT

## 2025-08-20 PROCEDURE — 6370000000 HC RX 637 (ALT 250 FOR IP)

## 2025-08-20 PROCEDURE — 2580000003 HC RX 258: Performed by: FAMILY MEDICINE

## 2025-08-20 RX ORDER — FERROUS SULFATE 325(65) MG
325 TABLET ORAL
Qty: 30 TABLET | Refills: 0 | Status: SHIPPED | OUTPATIENT
Start: 2025-08-21

## 2025-08-20 RX ORDER — POTASSIUM CHLORIDE 1500 MG/1
20 TABLET, EXTENDED RELEASE ORAL DAILY
Qty: 3 TABLET | Refills: 0 | Status: SHIPPED | OUTPATIENT
Start: 2025-08-20 | End: 2025-08-23

## 2025-08-20 RX ADMIN — BUSPIRONE HYDROCHLORIDE 5 MG: 5 TABLET ORAL at 09:14

## 2025-08-20 RX ADMIN — AMANTADINE HYDROCHLORIDE 100 MG: 100 CAPSULE ORAL at 09:14

## 2025-08-20 RX ADMIN — AMOXICILLIN AND CLAVULANATE POTASSIUM 1 TABLET: 875; 125 TABLET, FILM COATED ORAL at 12:40

## 2025-08-20 RX ADMIN — SODIUM CHLORIDE, PRESERVATIVE FREE 10 ML: 5 INJECTION INTRAVENOUS at 09:14

## 2025-08-20 RX ADMIN — TAMSULOSIN HYDROCHLORIDE 0.4 MG: 0.4 CAPSULE ORAL at 09:14

## 2025-08-20 RX ADMIN — PIPERACILLIN AND TAZOBACTAM 3375 MG: 3; .375 INJECTION, POWDER, LYOPHILIZED, FOR SOLUTION INTRAVENOUS at 06:50

## 2025-08-20 RX ADMIN — ACETAMINOPHEN 650 MG: 325 TABLET ORAL at 11:19

## 2025-08-20 RX ADMIN — FERROUS SULFATE TAB 325 MG (65 MG ELEMENTAL FE) 325 MG: 325 (65 FE) TAB at 09:14

## 2025-08-20 RX ADMIN — DOCUSATE SODIUM 100 MG: 100 CAPSULE, LIQUID FILLED ORAL at 09:14

## 2025-08-20 ASSESSMENT — PAIN SCALES - GENERAL
PAINLEVEL_OUTOF10: 6
PAINLEVEL_OUTOF10: 0

## 2025-08-20 ASSESSMENT — PAIN DESCRIPTION - ORIENTATION: ORIENTATION: LEFT

## 2025-08-20 ASSESSMENT — PAIN DESCRIPTION - LOCATION: LOCATION: HAND

## 2025-08-20 ASSESSMENT — PAIN DESCRIPTION - DESCRIPTORS: DESCRIPTORS: SORE

## 2025-08-20 ASSESSMENT — PAIN - FUNCTIONAL ASSESSMENT: PAIN_FUNCTIONAL_ASSESSMENT: 0-10

## (undated) DEVICE — TOWEL,OR,DSP,ST,BLUE,STD,4/PK,20PK/CS: Brand: MEDLINE

## (undated) DEVICE — DUAL CUT SAGITTAL BLADE

## (undated) DEVICE — DRAPE,SPLIT ,77X120: Brand: MEDLINE

## (undated) DEVICE — NEEDLE SPNL 18GA L3.5IN W/ QNCKE SHARPER BVL DURA CLICK

## (undated) DEVICE — SPONGE GZ 4X4 IN 16-PLY DETECTABLE W/ DMT MSTR TAG

## (undated) DEVICE — HANDPIECE SET WITH HIGH FLOW TIP AND SUCTION TUBE: Brand: INTERPULSE

## (undated) DEVICE — 3M™ IOBAN™ 2 ANTIMICROBIAL INCISE DRAPE 6648EZ: Brand: IOBAN™ 2

## (undated) DEVICE — MAJOR ORTHO PROCEDURE PACK: Brand: MEDLINE INDUSTRIES, INC.

## (undated) DEVICE — INTENDED FOR TISSUE SEPARATION, AND OTHER PROCEDURES THAT REQUIRE A SHARP SURGICAL BLADE TO PUNCTURE OR CUT.: Brand: BARD-PARKER ®  SAFETY SCALPED

## (undated) DEVICE — PAD ABSORBENT 40X28 IN POLY BACKING BLU DRI-SAFE

## (undated) DEVICE — HYPODERMIC SAFETY NEEDLE: Brand: MONOJECT

## (undated) DEVICE — SUTURE MONOCRYL STRATAFIX SPRL SZ 3-0 L12IN ABSRB UD FS-1 L30X30CM SXMP2B410

## (undated) DEVICE — STAPLER SKIN H3.9MM WIRE DIA0.58MM CRWN 6.9MM 35 STPL ROT

## (undated) DEVICE — DRAPE ORTH W84XL125IN PT ISOLATN ADH FILM STRIP

## (undated) DEVICE — COVER,TABLE,HEAVY DUTY,79"X110",STRL: Brand: MEDLINE

## (undated) DEVICE — 3M™ STERI-DRAPE™ INCISE DRAPE 1050 (60CM X 45CM): Brand: STERI-DRAPE™

## (undated) DEVICE — 3M™ STERI-DRAPE™ U-DRAPE, LONG 1019: Brand: STERI-DRAPE™

## (undated) DEVICE — DRESSING ANTIMIC FOAM MEPILEX BORD POSTOP AG PROD SZ 4X12 IN

## (undated) DEVICE — BASIC SINGLE BASIN-LF: Brand: MEDLINE INDUSTRIES, INC.

## (undated) DEVICE — SPEEDSET FULL DOSE ANTIBIOTIC BONE CEMENT, 10 PACK CATALOG NUMBER IS 6192-1-010
Type: IMPLANTABLE DEVICE | Site: HIP | Status: NON-FUNCTIONAL
Brand: SIMPLEX

## (undated) DEVICE — CEMENT MIXING SYSTEM WITH FEMORAL BREAKWAY NOZZLE: Brand: REVOLUTION

## (undated) DEVICE — 20 ML SYRINGE LUER-LOCK TIP: Brand: MONOJECT

## (undated) DEVICE — SUTURE VICRYL + SZ 2-0 L27IN ABSRB WHT SH 1/2 CIR TAPERCUT VCP417H

## (undated) DEVICE — 2108 SERIES SAGITTAL BLADE FLARED, GROUND  (19.0 X 1.37 X 90.0MM)

## (undated) DEVICE — 3M™ IOBAN™ 2 ANTIMICROBIAL INCISE DRAPE 6651: Brand: IOBAN™ 2

## (undated) DEVICE — SOLUTION IV 500ML 0.9% SOD CHL PH 5 INJ USP VIAFLX PLAS

## (undated) DEVICE — 3M™ COBAN™ NL STERILE NON-LATEX SELF-ADHERENT WRAP, 2084S, 4 IN X 5 YD (10 CM X 4,5 M), 18 ROLLS/CASE: Brand: 3M™ COBAN™

## (undated) DEVICE — SUTURE MONOCRYL + SZ 3-0 L27IN ABSRB UD PS1 L24MM 3/8 CIR REV MCP936H

## (undated) DEVICE — PILLOW ABDUCTN MED 23X15X6 IN HIP SFT CONTACT CLOSURE

## (undated) DEVICE — SUTURE PDS II SZ 1 L36IN ABSRB VLT L36MM CT-1 1/2 CIR Z347H

## (undated) DEVICE — DRAPE,U/ SHT,SPLIT,PLAS,STERIL: Brand: MEDLINE

## (undated) DEVICE — 3M™ STERI-STRIP™ REINFORCED ADHESIVE SKIN CLOSURES, R1547, 1/2 IN X 4 IN (12 MM X 100 MM), 6 STRIPS/ENVELOPE: Brand: 3M™ STERI-STRIP™

## (undated) DEVICE — SUTURE VICRYL + SZ 1 L36IN ABSRB UD L36MM CT-1 1/2 CIR VCP947H

## (undated) DEVICE — ADHESIVE SKIN CLOSURE WND 8.661X1.5 IN 22 CM LIQUIBAND SECUR

## (undated) DEVICE — DRAPE EQUIP C ARM 74X42 IN MOB XR W/ TIE RUBBER BND LF